# Patient Record
Sex: FEMALE | Race: WHITE | NOT HISPANIC OR LATINO | Employment: OTHER | URBAN - NONMETROPOLITAN AREA
[De-identification: names, ages, dates, MRNs, and addresses within clinical notes are randomized per-mention and may not be internally consistent; named-entity substitution may affect disease eponyms.]

---

## 2018-04-11 ENCOUNTER — OFFICE VISIT (OUTPATIENT)
Dept: CARDIOLOGY | Facility: CLINIC | Age: 63
End: 2018-04-11

## 2018-04-11 VITALS
DIASTOLIC BLOOD PRESSURE: 71 MMHG | HEART RATE: 79 BPM | HEIGHT: 63 IN | BODY MASS INDEX: 40.36 KG/M2 | WEIGHT: 227.8 LBS | OXYGEN SATURATION: 94 % | SYSTOLIC BLOOD PRESSURE: 116 MMHG

## 2018-04-11 DIAGNOSIS — R07.2 PRECORDIAL PAIN: Primary | ICD-10-CM

## 2018-04-11 DIAGNOSIS — R53.83 FATIGUE, UNSPECIFIED TYPE: ICD-10-CM

## 2018-04-11 DIAGNOSIS — E78.2 MIXED HYPERLIPIDEMIA: ICD-10-CM

## 2018-04-11 DIAGNOSIS — E11.9 TYPE 2 DIABETES MELLITUS WITHOUT COMPLICATION, UNSPECIFIED LONG TERM INSULIN USE STATUS: ICD-10-CM

## 2018-04-11 DIAGNOSIS — R06.02 SHORTNESS OF BREATH: ICD-10-CM

## 2018-04-11 DIAGNOSIS — F17.200 SMOKER: ICD-10-CM

## 2018-04-11 DIAGNOSIS — I10 ESSENTIAL HYPERTENSION: ICD-10-CM

## 2018-04-11 PROBLEM — E07.9 THYROID DISEASE: Status: ACTIVE | Noted: 2018-04-11

## 2018-04-11 PROCEDURE — 99204 OFFICE O/P NEW MOD 45 MIN: CPT | Performed by: INTERNAL MEDICINE

## 2018-04-11 RX ORDER — HYDROCODONE BITARTRATE AND ACETAMINOPHEN 10; 325 MG/15ML; MG/15ML
1 SOLUTION ORAL EVERY 8 HOURS PRN
Refills: 0 | COMMUNITY
Start: 2018-03-23

## 2018-04-11 RX ORDER — GABAPENTIN 800 MG/1
600 TABLET ORAL 3 TIMES DAILY
Refills: 0 | COMMUNITY
Start: 2018-03-27 | End: 2022-06-01 | Stop reason: DRUGHIGH

## 2018-04-11 RX ORDER — ATORVASTATIN CALCIUM 80 MG/1
80 TABLET, FILM COATED ORAL
Refills: 0 | COMMUNITY
Start: 2018-04-02

## 2018-04-11 RX ORDER — ALPRAZOLAM 0.5 MG/1
0.5 TABLET ORAL 2 TIMES DAILY
Refills: 0 | COMMUNITY
Start: 2018-03-13 | End: 2018-06-06 | Stop reason: ALTCHOICE

## 2018-04-11 RX ORDER — ESOMEPRAZOLE MAGNESIUM 40 MG/1
CAPSULE, DELAYED RELEASE ORAL DAILY
COMMUNITY
Start: 2018-04-10

## 2018-04-11 RX ORDER — LISINOPRIL 10 MG/1
10 TABLET ORAL DAILY
Refills: 0 | COMMUNITY
Start: 2018-02-09 | End: 2019-05-15 | Stop reason: SDUPTHER

## 2018-04-11 RX ORDER — LEVOTHYROXINE SODIUM 0.12 MG/1
125 TABLET ORAL DAILY
Refills: 0 | COMMUNITY
Start: 2018-04-02

## 2018-04-11 RX ORDER — CHOLECALCIFEROL (VITAMIN D3) 1250 MCG
CAPSULE ORAL
Refills: 0 | COMMUNITY
Start: 2018-04-02 | End: 2018-06-06

## 2018-04-11 RX ORDER — ASPIRIN 81 MG/1
81 TABLET ORAL DAILY
Qty: 30 TABLET | Refills: 11 | Status: SHIPPED | OUTPATIENT
Start: 2018-04-11

## 2018-04-11 RX ORDER — NITROGLYCERIN 0.4 MG/1
TABLET SUBLINGUAL
Qty: 25 TABLET | Refills: 2 | Status: SHIPPED | OUTPATIENT
Start: 2018-04-11 | End: 2020-11-05 | Stop reason: SDUPTHER

## 2018-06-06 ENCOUNTER — OFFICE VISIT (OUTPATIENT)
Dept: CARDIOLOGY | Facility: CLINIC | Age: 63
End: 2018-06-06

## 2018-06-06 VITALS
DIASTOLIC BLOOD PRESSURE: 73 MMHG | SYSTOLIC BLOOD PRESSURE: 137 MMHG | WEIGHT: 226 LBS | HEIGHT: 63 IN | BODY MASS INDEX: 40.04 KG/M2 | OXYGEN SATURATION: 94 % | HEART RATE: 92 BPM

## 2018-06-06 DIAGNOSIS — I10 ESSENTIAL HYPERTENSION: ICD-10-CM

## 2018-06-06 DIAGNOSIS — R07.9 CHEST PAIN, UNSPECIFIED TYPE: ICD-10-CM

## 2018-06-06 DIAGNOSIS — R06.02 SHORTNESS OF BREATH: Primary | ICD-10-CM

## 2018-06-06 PROCEDURE — 99214 OFFICE O/P EST MOD 30 MIN: CPT | Performed by: PHYSICIAN ASSISTANT

## 2018-06-06 RX ORDER — MELOXICAM 7.5 MG/1
7.5 TABLET ORAL DAILY
COMMUNITY

## 2018-06-06 NOTE — PROGRESS NOTES
"Problem list     Subjective   Kathrine Bella is a 62 y.o. female     Chief Complaint   Patient presents with   • Hypertension     Here for testing f/u, tests not done   • Hyperlipidemia   • Diabetes       HPI    Patient is a 62-year-old female that presents back to the office for follow-up.  Patient was seen in April by Dr. Yuan for an initial visit.  She complained of chest pain and dyspnea.  Patient has history of nonobstructive coronary artery disease by catheterization approximately 10 years ago.  However, because of patient's continued symptoms recently, she was scheduled for an ischemia assessment.  Patient did not undergo testing because she was told by acquaintances that stress test or dangerous because they \"stop your heart and cause a heart attack\".    Patient continues to have symptoms.  We have counseled her thatThat is not the case.  She is ready for further evaluation      Outpatient Encounter Prescriptions as of 6/6/2018   Medication Sig Dispense Refill   • aspirin 81 MG EC tablet Take 1 tablet by mouth Daily. 30 tablet 11   • atorvastatin (LIPITOR) 80 MG tablet Take 80 mg by mouth every night at bedtime.  0   • esomeprazole (nexIUM) 40 MG capsule Daily.     • gabapentin (NEURONTIN) 600 MG tablet Take 600 mg by mouth 2 (Two) Times a Day.  0   • HYDROcodone-acetaminophen (NORCO) 5-325 MG per tablet Take 1 tablet by mouth 2 (Two) Times a Day As Needed. for pain  0   • levothyroxine (SYNTHROID, LEVOTHROID) 125 MCG tablet Take 125 mcg by mouth Daily.  0   • lisinopril (PRINIVIL,ZESTRIL) 10 MG tablet Take 10 mg by mouth Daily.  0   • meloxicam (MOBIC) 7.5 MG tablet Take 7.5 mg by mouth Daily.     • metFORMIN ER (GLUCOPHAGE-XR) 500 MG 24 hr tablet 2 (Two) Times a Day.  0   • PROAIR  (90 Base) MCG/ACT inhaler As Needed.  0   • nitroglycerin (NITROSTAT) 0.4 MG SL tablet 1 under the tongue as needed for angina, may repeat q5mins for up three doses 25 tablet 2   • [DISCONTINUED] ALPRAZolam (XANAX) 0.5 MG " tablet Take 0.5 mg by mouth 2 (Two) Times a Day.  0   • [DISCONTINUED] Cholecalciferol (VITAMIN D3) 63981 units capsule Every 14 (Fourteen) Days.  0     No facility-administered encounter medications on file as of 6/6/2018.        Patient has no known allergies.    Past Medical History:   Diagnosis Date   • Asthma    • Coronary artery disease    • Diabetes mellitus    • Hyperlipidemia    • Hypertension    • Myocardial infarction    • Thyroid disease        Social History     Social History   • Marital status: Legally      Spouse name: N/A   • Number of children: N/A   • Years of education: N/A     Occupational History   • Not on file.     Social History Main Topics   • Smoking status: Current Every Day Smoker   • Smokeless tobacco: Never Used   • Alcohol use No   • Drug use: No   • Sexual activity: Defer     Other Topics Concern   • Not on file     Social History Narrative   • No narrative on file       Family History   Problem Relation Age of Onset   • Heart disease Mother    • Heart attack Mother    • Hypertension Mother    • Heart disease Father    • Heart attack Father    • Hypertension Father    • Heart disease Brother        Review of Systems   Constitutional: Negative.    HENT: Negative.    Eyes: Positive for visual disturbance (glasses prn).   Respiratory: Positive for shortness of breath.    Cardiovascular: Positive for chest pain and leg swelling. Negative for palpitations.   Gastrointestinal: Negative.    Endocrine: Negative.    Genitourinary: Negative.    Musculoskeletal: Positive for arthralgias and myalgias.   Skin: Negative.    Allergic/Immunologic: Positive for environmental allergies.   Neurological: Negative.    Hematological: Negative.    Psychiatric/Behavioral: Negative.    All other systems reviewed and are negative.      Objective   Vitals:    06/06/18 1309   BP: 137/73   BP Location: Left arm   Patient Position: Sitting   Pulse: 92   SpO2: 94%   Weight: 103 kg (226 lb)   Height: 160  "cm (62.99\")      /73 (BP Location: Left arm, Patient Position: Sitting)   Pulse 92   Ht 160 cm (62.99\")   Wt 103 kg (226 lb)   SpO2 94%   BMI 40.04 kg/m²     Lab Results (most recent)     None          Physical Exam   Constitutional: She is oriented to person, place, and time. She appears well-developed and well-nourished. No distress.   HENT:   Head: Normocephalic and atraumatic.   Eyes: EOM are normal. Pupils are equal, round, and reactive to light.   Neck: No JVD present.   Cardiovascular: Normal rate, regular rhythm, normal heart sounds and intact distal pulses.  Exam reveals no gallop and no friction rub.    No murmur heard.  Pulmonary/Chest: Effort normal and breath sounds normal. No respiratory distress. She has no wheezes. She has no rales. She exhibits no tenderness.   Musculoskeletal: Normal range of motion. She exhibits no edema.   Neurological: She is alert and oriented to person, place, and time. No cranial nerve deficit.   Skin: Skin is warm and dry. No rash noted. No erythema. No pallor.   Psychiatric: She has a normal mood and affect. Her behavior is normal.   Nursing note and vitals reviewed.      Procedure   Procedures       Assessment/Plan     Problems Addressed this Visit        Cardiovascular and Mediastinum    Essential hypertension    Relevant Orders    Stress Test With Myocardial Perfusion One Day    Adult Transthoracic Echo Complete W/ Cont if Necessary Per Protocol       Respiratory    Shortness of breath - Primary    Relevant Orders    Stress Test With Myocardial Perfusion One Day    Adult Transthoracic Echo Complete W/ Cont if Necessary Per Protocol       Nervous and Auditory    Chest pain    Relevant Orders    Stress Test With Myocardial Perfusion One Day    Adult Transthoracic Echo Complete W/ Cont if Necessary Per Protocol              Recommendation  1.  Patient will be rescheduled for ischemia assessment.  She is on appropriate medications otherwise.  Any chest pain or " supple nitroglycerin, she will go to the ER.  Otherwise we will see her back for follow-up on testing.  Follow-up primary as scheduled       I advised the patient of the risks in continuing to use tobacco, and I provided this patient with smoking cessation educational materials.    During this visit, I spent <3 minutes counseling the patient regarding smoking cessation.     Patient's Body mass index is 40.04 kg/m². BMI is above normal parameters. Recommendations include: educational material.       Electronically signed by:

## 2018-06-06 NOTE — PATIENT INSTRUCTIONS
For more information:    Quit Now [place]  1-800-QUIT-NOW  https://[place].quitlogix.org/en-[place]/  Steps to Quit Smoking  Smoking tobacco can be harmful to your health and can affect almost every organ in your body. Smoking puts you, and those around you, at risk for developing many serious chronic diseases. Quitting smoking is difficult, but it is one of the best things that you can do for your health. It is never too late to quit.  What are the benefits of quitting smoking?  When you quit smoking, you lower your risk of developing serious diseases and conditions, such as:  · Lung cancer or lung disease, such as COPD.  · Heart disease.  · Stroke.  · Heart attack.  · Infertility.  · Osteoporosis and bone fractures.  Additionally, symptoms such as coughing, wheezing, and shortness of breath may get better when you quit. You may also find that you get sick less often because your body is stronger at fighting off colds and infections. If you are pregnant, quitting smoking can help to reduce your chances of having a baby of low birth weight.  How do I get ready to quit?  When you decide to quit smoking, create a plan to make sure that you are successful. Before you quit:  · Pick a date to quit. Set a date within the next two weeks to give you time to prepare.  · Write down the reasons why you are quitting. Keep this list in places where you will see it often, such as on your bathroom mirror or in your car or wallet.  · Identify the people, places, things, and activities that make you want to smoke (triggers) and avoid them. Make sure to take these actions:  ¨ Throw away all cigarettes at home, at work, and in your car.  ¨ Throw away smoking accessories, such as ashtrays and lighters.  ¨ Clean your car and make sure to empty the ashtray.  ¨ Clean your home, including curtains and carpets.  · Tell your family, friends, and coworkers that you are quitting. Support from your loved ones can make quitting easier.  · Talk with  your health care provider about your options for quitting smoking.  · Find out what treatment options are covered by your health insurance.  What strategies can I use to quit smoking?  Talk with your healthcare provider about different strategies to quit smoking. Some strategies include:  · Quitting smoking altogether instead of gradually lessening how much you smoke over a period of time. Research shows that quitting “cold turkey” is more successful than gradually quitting.  · Attending in-person counseling to help you build problem-solving skills. You are more likely to have success in quitting if you attend several counseling sessions. Even short sessions of 10 minutes can be effective.  · Finding resources and support systems that can help you to quit smoking and remain smoke-free after you quit. These resources are most helpful when you use them often. They can include:  ¨ Online chats with a counselor.  ¨ Telephone quitlines.  ¨ Printed self-help materials.  ¨ Support groups or group counseling.  ¨ Text messaging programs.  ¨ Mobile phone applications.  · Taking medicines to help you quit smoking. (If you are pregnant or breastfeeding, talk with your health care provider first.) Some medicines contain nicotine and some do not. Both types of medicines help with cravings, but the medicines that include nicotine help to relieve withdrawal symptoms. Your health care provider may recommend:  ¨ Nicotine patches, gum, or lozenges.  ¨ Nicotine inhalers or sprays.  ¨ Non-nicotine medicine that is taken by mouth.  Talk with your health care provider about combining strategies, such as taking medicines while you are also receiving in-person counseling. Using these two strategies together makes you more likely to succeed in quitting than if you used either strategy on its own.  If you are pregnant or breastfeeding, talk with your health care provider about finding counseling or other support strategies to quit smoking. Do  not take medicine to help you quit smoking unless told to do so by your health care provider.  What things can I do to make it easier to quit?  Quitting smoking might feel overwhelming at first, but there is a lot that you can do to make it easier. Take these important actions:  · Reach out to your family and friends and ask that they support and encourage you during this time. Call telephone quitlines, reach out to support groups, or work with a counselor for support.  · Ask people who smoke to avoid smoking around you.  · Avoid places that trigger you to smoke, such as bars, parties, or smoke-break areas at work.  · Spend time around people who do not smoke.  · Lessen stress in your life, because stress can be a smoking trigger for some people. To lessen stress, try:  ¨ Exercising regularly.  ¨ Deep-breathing exercises.  ¨ Yoga.  ¨ Meditating.  ¨ Performing a body scan. This involves closing your eyes, scanning your body from head to toe, and noticing which parts of your body are particularly tense. Purposefully relax the muscles in those areas.  · Download or purchase mobile phone or tablet apps (applications) that can help you stick to your quit plan by providing reminders, tips, and encouragement. There are many free apps, such as QuitGuide from the CDC (Centers for Disease Control and Prevention). You can find other support for quitting smoking (smoking cessation) through smokefree.gov and other websites.  How will I feel when I quit smoking?  Within the first 24 hours of quitting smoking, you may start to feel some withdrawal symptoms. These symptoms are usually most noticeable 2-3 days after quitting, but they usually do not last beyond 2-3 weeks. Changes or symptoms that you might experience include:  · Mood swings.  · Restlessness, anxiety, or irritation.  · Difficulty concentrating.  · Dizziness.  · Strong cravings for sugary foods in addition to nicotine.  · Mild weight  gain.  · Constipation.  · Nausea.  · Coughing or a sore throat.  · Changes in how your medicines work in your body.  · A depressed mood.  · Difficulty sleeping (insomnia).  After the first 2-3 weeks of quitting, you may start to notice more positive results, such as:  · Improved sense of smell and taste.  · Decreased coughing and sore throat.  · Slower heart rate.  · Lower blood pressure.  · Clearer skin.  · The ability to breathe more easily.  · Fewer sick days.  Quitting smoking is very challenging for most people. Do not get discouraged if you are not successful the first time. Some people need to make many attempts to quit before they achieve long-term success. Do your best to stick to your quit plan, and talk with your health care provider if you have any questions or concerns.  This information is not intended to replace advice given to you by your health care provider. Make sure you discuss any questions you have with your health care provider.  Document Released: 12/12/2002 Document Revised: 08/15/2017 Document Reviewed: 05/03/2016  [employer] Interactive Patient Education © 2017 [employer]  Obesity, Adult  Obesity is the condition of having too much total body fat. Being overweight or obese means that your weight is greater than what is considered healthy for your body size. Obesity is determined by a measurement called BMI. BMI is an estimate of body fat and is calculated from height and weight. For adults, a BMI of 30 or higher is considered obese.  Obesity can eventually lead to other health concerns and major illnesses, including:  · Stroke.  · Coronary artery disease (CAD).  · Type 2 diabetes.  · Some types of cancer, including cancers of the colon, breast, uterus, and gallbladder.  · Osteoarthritis.  · High blood pressure (hypertension).  · High cholesterol.  · Sleep apnea.  · Gallbladder stones.  · Infertility problems.  What are the causes?  The main cause of obesity is taking in (consuming) more  calories than your body uses for energy. Other factors that contribute to this condition may include:  · Being born with genes that make you more likely to become obese.  · Having a medical condition that causes obesity. These conditions include:  ¨ Hypothyroidism.  ¨ Polycystic ovarian syndrome (PCOS).  ¨ Binge-eating disorder.  ¨ Cushing syndrome.  · Taking certain medicines, such as steroids, antidepressants, and seizure medicines.  · Not being physically active (sedentary lifestyle).  · Living where there are limited places to exercise safely or buy healthy foods.  · Not getting enough sleep.  What increases the risk?  The following factors may increase your risk of this condition:  · Having a family history of obesity.  · Being a woman of -American descent.  · Being a man of  descent.  What are the signs or symptoms?  Having excessive body fat is the main symptom of this condition.  How is this diagnosed?  This condition may be diagnosed based on:  · Your symptoms.  · Your medical history.  · A physical exam. Your health care provider may measure:  ¨ Your BMI. If you are an adult with a BMI between 25 and less than 30, you are considered overweight. If you are an adult with a BMI of 30 or higher, you are considered obese.  ¨ The distances around your hips and your waist (circumferences). These may be compared to each other to help diagnose your condition.  ¨ Your skinfold thickness. Your health care provider may gently pinch a fold of your skin and measure it.  How is this treated?  Treatment for this condition often includes changing your lifestyle. Treatment may include some or all of the following:  · Dietary changes. Work with your health care provider and a dietitian to set a weight-loss goal that is healthy and reasonable for you. Dietary changes may include eating:  ¨ Smaller portions. A portion size is the amount of a particular food that is healthy for you to eat at one time. This varies  from person to person.  ¨ Low-calorie or low-fat options.  ¨ More whole grains, fruits, and vegetables.  · Regular physical activity. This may include aerobic activity (cardio) and strength training.  · Medicine to help you lose weight. Your health care provider may prescribe medicine if you are unable to lose 1 pound a week after 6 weeks of eating more healthily and doing more physical activity.  · Surgery. Surgical options may include gastric banding and gastric bypass. Surgery may be done if:  ¨ Other treatments have not helped to improve your condition.  ¨ You have a BMI of 40 or higher.  ¨ You have life-threatening health problems related to obesity.  Follow these instructions at home:     Eating and drinking     · Follow recommendations from your health care provider about what you eat and drink. Your health care provider may advise you to:  ¨ Limit fast foods, sweets, and processed snack foods.  ¨ Choose low-fat options, such as low-fat milk instead of whole milk.  ¨ Eat 5 or more servings of fruits or vegetables every day.  ¨ Eat at home more often. This gives you more control over what you eat.  ¨ Choose healthy foods when you eat out.  ¨ Learn what a healthy portion size is.  ¨ Keep low-fat snacks on hand.  ¨ Avoid sugary drinks, such as soda, fruit juice, iced tea sweetened with sugar, and flavored milk.  ¨ Eat a healthy breakfast.  · Drink enough water to keep your urine clear or pale yellow.  · Do not go without eating for long periods of time (do not fast) or follow a fad diet. Fasting and fad diets can be unhealthy and even dangerous.  Physical Activity   · Exercise regularly, as told by your health care provider. Ask your health care provider what types of exercise are safe for you and how often you should exercise.  · Warm up and stretch before being active.  · Cool down and stretch after being active.  · Rest between periods of activity.  Lifestyle   · Limit the time that you spend in front of your  TV, computer, or video game system.  · Find ways to reward yourself that do not involve food.  · Limit alcohol intake to no more than 1 drink a day for nonpregnant women and 2 drinks a day for men. One drink equals 12 oz of beer, 5 oz of wine, or 1½ oz of hard liquor.  General instructions   · Keep a weight loss journal to keep track of the food you eat and how much you exercise you get.  · Take over-the-counter and prescription medicines only as told by your health care provider.  · Take vitamins and supplements only as told by your health care provider.  · Consider joining a support group. Your health care provider may be able to recommend a support group.  · Keep all follow-up visits as told by your health care provider. This is important.  Contact a health care provider if:  · You are unable to meet your weight loss goal after 6 weeks of dietary and lifestyle changes.  This information is not intended to replace advice given to you by your health care provider. Make sure you discuss any questions you have with your health care provider.  Document Released: 01/25/2006 Document Revised: 05/22/2017 Document Reviewed: 10/05/2016  [employer] Interactive Patient Education © 2017 [employer]  MyPlate from [employer]  The general, healthful diet is based on the 2010 Dietary Guidelines for Americans. The amount of food you need to eat from each food group depends on your age, sex, and level of physical activity and can be individualized by a dietitian. Go to ChooseMyPlate.gov for more information.  What do I need to know about the MyPlate plan?  · Enjoy your food, but eat less.  · Avoid oversized portions.  ¨ ½ of your plate should include fruits and vegetables.  ¨ ¼ of your plate should be grains.  ¨ ¼ of your plate should be protein.  Grains   · Make at least half of your grains whole grains.  · For a 2,000 calorie daily food plan, eat 6 oz every day.  · 1 oz is about 1 slice bread, 1 cup cereal, or ½ cup cooked rice,  cereal, or pasta.  Vegetables   · Make half your plate fruits and vegetables.  · For a 2,000 calorie daily food plan, eat 2½ cups every day.  · 1 cup is about 1 cup raw or cooked vegetables or vegetable juice or 2 cups raw leafy greens.  Fruits   · Make half your plate fruits and vegetables.  · For a 2,000 calorie daily food plan, eat 2 cups every day.  · 1 cup is about 1 cup fruit or 100% fruit juice or ½ cup dried fruit.  Protein   · For a 2,000 calorie daily food plan, eat 5½ oz every day.  · 1 oz is about 1 oz meat, poultry, or fish, ¼ cup cooked beans, 1 egg, 1 Tbsp peanut butter, or ½ oz nuts or seeds.  Dairy   · Switch to fat-free or low-fat (1%) milk.  · For a 2,000 calorie daily food plan, eat 3 cups every day.  · 1 cup is about 1 cup milk or yogurt or soy milk (soy beverage), 1½ oz natural cheese, or 2 oz processed cheese.  Fats, Oils, and Empty Calories   · Only small amounts of oils are recommended.  · Empty calories are calories from solid fats or added sugars.  · Compare sodium in foods like soup, bread, and frozen meals. Choose the foods with lower numbers.  · Drink water instead of sugary drinks.  What foods can I eat?  Grains   Whole grains such as whole wheat, quinoa, millet, and bulgur. Bread, rolls, and pasta made from whole grains. Brown or wild rice. Hot or cold cereals made from whole grains and without added sugar.  Vegetables   All fresh vegetables, especially fresh red, dark green, or orange vegetables. Peas and beans. Low-sodium frozen or canned vegetables prepared without added salt. Low-sodium vegetable juices.  Fruits   All fresh, frozen, and dried fruits. Canned fruit packed in water or fruit juice without added sugar. Fruit juices without added sugar.  Meats and Other Protein Sources   Boiled, baked, or grilled lean meat trimmed of fat. Skinless poultry. Fresh seafood and shellfish. Canned seafood packed in water. Unsalted nuts and unsalted nut butters. Tofu. Dried beans and pea.  Eggs.  Dairy   Low-fat or fat-free milk, yogurt, and cheeses.  Sweets and Desserts   Frozen desserts made from low-fat milk.  Fats and Oils   Olive, peanut, and canola oils and margarine. Salad dressing and mayonnaise made from these oils.  Other   Soups and casseroles made from allowed ingredients and without added fat or salt.  The items listed above may not be a complete list of recommended foods or beverages. Contact your dietitian for more options.   What foods are not recommended?  Grains   Sweetened, low-fiber cereals. Packaged baked goods. Snack crackers and chips. Cheese crackers, butter crackers, and biscuits. Frozen waffles, sweet breads, doughnuts, pastries, packaged baking mixes, pancakes, cakes, and cookies.  Vegetables   Regular canned or frozen vegetables or vegetables prepared with salt. Canned tomatoes. Canned tomato sauce. Fried vegetables. Vegetables in cream sauce or cheese sauce.  Fruits   Fruits packed in syrup or made with added sugar.  Meats and Other Protein Sources   Marbled or fatty meats such as ribs. Poultry with skin. Fried meats, poultry, eggs, or fish. Sausages, hot dogs, and deli meats such as pastrami, bologna, or salami.  Dairy   Whole milk, cream, cheeses made from whole milk, sour cream. Ice cream or yogurt made from whole milk or with added sugar.  Beverages   For adults, no more than one alcoholic drink per day. Regular soft drinks or other sugary beverages. Juice drinks.  Sweets and Desserts   Sugary or fatty desserts, candy, and other sweets.  Fats and Oils   Solid shortening or partially hydrogenated oils. Solid margarine. Margarine that contains trans fats. Butter.  The items listed above may not be a complete list of foods and beverages to avoid. Contact your dietitian for more information.   This information is not intended to replace advice given to you by your health care provider. Make sure you discuss any questions you have with your health care provider.  Document  Released: 01/06/2009 Document Revised: 05/25/2017 Document Reviewed: 11/26/2014  [employer] Interactive Patient Education © 2017 [employer]

## 2019-05-01 ENCOUNTER — OFFICE VISIT (OUTPATIENT)
Dept: CARDIOLOGY | Facility: CLINIC | Age: 64
End: 2019-05-01

## 2019-05-01 VITALS
SYSTOLIC BLOOD PRESSURE: 129 MMHG | HEART RATE: 87 BPM | WEIGHT: 218 LBS | OXYGEN SATURATION: 96 % | DIASTOLIC BLOOD PRESSURE: 75 MMHG | HEIGHT: 63 IN | BODY MASS INDEX: 38.62 KG/M2

## 2019-05-01 DIAGNOSIS — R07.9 CHEST PAIN, UNSPECIFIED TYPE: ICD-10-CM

## 2019-05-01 DIAGNOSIS — R06.02 SHORTNESS OF BREATH: Primary | ICD-10-CM

## 2019-05-01 DIAGNOSIS — R00.2 PALPITATIONS: ICD-10-CM

## 2019-05-01 PROCEDURE — 99214 OFFICE O/P EST MOD 30 MIN: CPT | Performed by: PHYSICIAN ASSISTANT

## 2019-05-01 PROCEDURE — 93000 ELECTROCARDIOGRAM COMPLETE: CPT | Performed by: PHYSICIAN ASSISTANT

## 2019-05-01 RX ORDER — ESCITALOPRAM OXALATE 20 MG/1
20 TABLET ORAL DAILY
COMMUNITY

## 2019-05-01 NOTE — PATIENT INSTRUCTIONS
Steps to Quit Smoking  Smoking tobacco can be bad for your health. It can also affect almost every organ in your body. Smoking puts you and people around you at risk for many serious long-lasting (chronic) diseases. Quitting smoking is hard, but it is one of the best things that you can do for your health. It is never too late to quit.  What are the benefits of quitting smoking?  When you quit smoking, you lower your risk for getting serious diseases and conditions. They can include:  · Lung cancer or lung disease.  · Heart disease.  · Stroke.  · Heart attack.  · Not being able to have children (infertility).  · Weak bones (osteoporosis) and broken bones (fractures).    If you have coughing, wheezing, and shortness of breath, those symptoms may get better when you quit. You may also get sick less often. If you are pregnant, quitting smoking can help to lower your chances of having a baby of low birth weight.  What can I do to help me quit smoking?  Talk with your doctor about what can help you quit smoking. Some things you can do (strategies) include:  · Quitting smoking totally, instead of slowly cutting back how much you smoke over a period of time.  · Going to in-person counseling. You are more likely to quit if you go to many counseling sessions.  · Using resources and support systems, such as:  ? Online chats with a counselor.  ? Phone quitlines.  ? Printed self-help materials.  ? Support groups or group counseling.  ? Text messaging programs.  ? Mobile phone apps or applications.  · Taking medicines. Some of these medicines may have nicotine in them. If you are pregnant or breastfeeding, do not take any medicines to quit smoking unless your doctor says it is okay. Talk with your doctor about counseling or other things that can help you.    Talk with your doctor about using more than one strategy at the same time, such as taking medicines while you are also going to in-person counseling. This can help make  quitting easier.  What things can I do to make it easier to quit?  Quitting smoking might feel very hard at first, but there is a lot that you can do to make it easier. Take these steps:  · Talk to your family and friends. Ask them to support and encourage you.  · Call phone quitlines, reach out to support groups, or work with a counselor.  · Ask people who smoke to not smoke around you.  · Avoid places that make you want (trigger) to smoke, such as:  ? Bars.  ? Parties.  ? Smoke-break areas at work.  · Spend time with people who do not smoke.  · Lower the stress in your life. Stress can make you want to smoke. Try these things to help your stress:  ? Getting regular exercise.  ? Deep-breathing exercises.  ? Yoga.  ? Meditating.  ? Doing a body scan. To do this, close your eyes, focus on one area of your body at a time from head to toe, and notice which parts of your body are tense. Try to relax the muscles in those areas.  · Download or buy apps on your mobile phone or tablet that can help you stick to your quit plan. There are many free apps, such as QuitGuide from the CDC (Centers for Disease Control and Prevention). You can find more support from smokefree.gov and other websites.    This information is not intended to replace advice given to you by your health care provider. Make sure you discuss any questions you have with your health care provider.  Document Released: 10/14/2010 Document Revised: 08/15/2017 Document Reviewed: 05/03/2016  [employer] Interactive Patient Education © 2019 [employer]  Fat and Cholesterol Restricted Eating Plan  Getting too much fat and cholesterol in your diet may cause health problems. Choosing the right foods helps keep your fat and cholesterol at normal levels. This can keep you from getting certain diseases.  Your doctor may recommend an eating plan that includes:  · Total fat: ______% or less of total calories a day.  · Saturated fat: ______% or less of total calories a  "day.  · Cholesterol: less than _________mg a day.  · Fiber: ______g a day.    What are tips for following this plan?  General tips  · Work with your doctor to lose weight if you need to.  · Avoid:  ? Foods with added sugar.  ? Fried foods.  ? Foods with partially hydrogenated oils.  · Limit alcohol intake to no more than 1 drink a day for nonpregnant women and 2 drinks a day for men. One drink equals 12 oz of beer, 5 oz of wine, or 1½ oz of hard liquor.  Reading food labels  · Check food labels for:  ? Trans fats.  ? Partially hydrogenated oils.  ? Saturated fat (g) in each serving.  ? Cholesterol (mg) in each serving.  ? Fiber (g) in each serving.  · Choose foods with healthy fats, such as:  ? Monounsaturated fats.  ? Polyunsaturated fats.  ? Omega-3 fats.  · Choose grain products that have whole grains. Look for the word \"whole\" as the first word in the ingredient list.  Cooking  · Cook foods using low-fat methods. These include baking, boiling, grilling, and broiling.  · Eat more home-cooked foods. Eat at restaurants and buffets less often.  · Avoid cooking using saturated fats, such as butter, cream, palm oil, palm kernel oil, and coconut oil.  Meal planning  · At meals, divide your plate into four equal parts:  ? Fill one-half of your plate with vegetables and green salads.  ? Fill one-fourth of your plate with whole grains.  ? Fill one-fourth of your plate with low-fat (lean) protein foods.  · Eat fish that is high in omega-3 fats at least two times a week. This includes mackerel, tuna, sardines, and salmon.  · Eat foods that are high in fiber, such as whole grains, beans, apples, broccoli, carrots, peas, and barley.  Recommended foods  Grains  · Whole grains, such as whole wheat or whole grain breads, crackers, cereals, and pasta. Unsweetened oatmeal, bulgur, barley, quinoa, or brown rice. Corn or whole wheat flour tortillas.  Vegetables  · Fresh or frozen vegetables (raw, steamed, roasted, or grilled). Green " salads.  Fruits  · All fresh, canned (in natural juice), or frozen fruits.  Meats and other protein foods  · Ground beef (85% or leaner), grass-fed beef, or beef trimmed of fat. Skinless chicken or turkey. Ground chicken or turkey. Pork trimmed of fat. All fish and seafood. Egg whites. Dried beans, peas, or lentils. Unsalted nuts or seeds. Unsalted canned beans. Nut butters without added sugar or oil.  Dairy  · Low-fat or nonfat dairy products, such as skim or 1% milk, 2% or reduced-fat cheeses, low-fat and fat-free ricotta or cottage cheese, or plain low-fat and nonfat yogurt.  Fats and oils  · Tub margarine without trans fats. Light or reduced-fat mayonnaise and salad dressings. Avocado. Olive, canola, sesame, or safflower oils.  The items listed above may not be a complete list of recommended foods or beverages. Contact your dietitian for more options.  Foods to avoid  Grains  · White bread. White pasta. White rice. Cornbread. Bagels, pastries, and croissants. Crackers and snack foods that contain trans fat and hydrogenated oils.  Vegetables  · Vegetables cooked in cheese, cream, or butter sauce. Fried vegetables.  Fruits  · Canned fruit in heavy syrup. Fruit in cream or butter sauce. Fried fruit.  Meats and other protein foods  · Fatty cuts of meat. Ribs, chicken wings, bullock, sausage, bologna, salami, chitterlings, fatback, hot dogs, bratwurst, and packaged lunch meats. Liver and organ meats. Whole eggs and egg yolks. Chicken and turkey with skin. Fried meat.  Dairy  · Whole or 2% milk, cream, half-and-half, and cream cheese. Whole milk cheeses. Whole-fat or sweetened yogurt. Full-fat cheeses. Nondairy creamers and whipped toppings. Processed cheese, cheese spreads, and cheese curds.  Beverages  · Alcohol. Sugar-sweetened drinks such as sodas, lemonade, and fruit drinks.  Fats and oils  · Butter, stick margarine, lard, shortening, ghee, or bullock fat. Coconut, palm kernel, and palm oils.  Sweets and  desserts  · Corn syrup, sugars, honey, and molasses. Candy. Jam and jelly. Syrup. Sweetened cereals. Cookies, pies, cakes, donuts, muffins, and ice cream.  The items listed above may not be a complete list of foods and beverages to avoid. Contact your dietitian for more information.  Summary  · Choosing the right foods helps keep your fat and cholesterol at normal levels. This can keep you from getting certain diseases.  · At meals, fill one-half of your plate with vegetables and green salads.  · Eat high-fiber foods, like whole grains, beans, apples, carrots, peas, and barley.  · Limit added sugar, saturated fats, alcohol, and fried foods.  This information is not intended to replace advice given to you by your health care provider. Make sure you discuss any questions you have with your health care provider.  Document Released: 06/18/2013 Document Revised: 09/04/2018 Document Reviewed: 09/04/2018  [employer] Interactive Patient Education © 2019 [employer]  BMI for Adults  Body mass index (BMI) is a number that is calculated from a person's weight and height. In most adults, the number is used to find how much of an adult's weight is made up of fat. BMI is not as accurate as a direct measure of body fat.  How is BMI calculated?  BMI is calculated by dividing weight in kilograms by height in meters squared. It can also be calculated by dividing weight in pounds by height in inches squared, then multiplying the resulting number by 703. Charts are available to help you find your BMI quickly and easily without doing this calculation.  How is BMI interpreted?  Health care professionals use BMI charts to identify whether an adult is underweight, at a normal weight, or overweight based on the following guidelines:  · Underweight: BMI less than 18.5.  · Normal weight: BMI between 18.5 and 24.9.  · Overweight: BMI between 25 and 29.9.  · Obese: BMI of 30 and above.    BMI is usually interpreted the same for males and  females.  Weight includes both fat and muscle, so someone with a muscular build, such as an athlete, may have a BMI that is higher than 24.9. In cases like these, BMI may not accurately depict body fat. To determine if excess body fat is the cause of a BMI of 25 or higher, further assessments may need to be done by a health care provider.  Why is BMI a useful tool?  BMI is used to identify a possible weight problem that may be related to a medical problem or may increase the risk for medical problems. BMI can also be used to promote changes to reach a healthy weight.  This information is not intended to replace advice given to you by your health care provider. Make sure you discuss any questions you have with your health care provider.  Document Released: 08/29/2005 Document Revised: 04/27/2017 Document Reviewed: 05/15/2015  [employer] Interactive Patient Education © 2018 [employer]

## 2019-05-01 NOTE — PROGRESS NOTES
Problem list     Subjective   Kathrine Bella is a 63 y.o. female     Chief Complaint   Patient presents with   • Hypertension   • Shortness of Breath       HPI      Patient is a 63-year-old female that presents to the office today for follow-up.  Patient was seen last year on 2 occasions with complaints of chest discomfort and scheduled for ischemia assessment.  She did not have this performed.    Patient describes being scared and did not want to have testing performed.  Patient describes that she has been having chest pain but over the last 2 to 3 days it has improved.  She describes sharp discomfort and pressure sensation in the precordial area.  This is not associated with arm or neck discomfort.  No nausea or diaphoresis.    She has mild levels of shortness of breath when exerting as well.  She has no PND orthopnea.    She does feel palpitations.  Interestingly, whenever she had chest pain, she will check her pulse and feel an irregular beat.  She has no associated dizziness presyncope or syncope.  She otherwise is doing well    Outpatient Encounter Medications as of 5/1/2019   Medication Sig Dispense Refill   • aspirin 81 MG EC tablet Take 1 tablet by mouth Daily. 30 tablet 11   • atorvastatin (LIPITOR) 80 MG tablet Take 80 mg by mouth every night at bedtime.  0   • escitalopram (LEXAPRO) 20 MG tablet Take 20 mg by mouth Daily.     • esomeprazole (nexIUM) 40 MG capsule Daily.     • gabapentin (NEURONTIN) 600 MG tablet Take 800 mg by mouth 3 (Three) Times a Day.  0   • HYDROcodone-acetaminophen (NORCO) 5-325 MG per tablet Take 1 tablet by mouth 2 (Two) Times a Day As Needed. for pain  0   • levothyroxine (SYNTHROID, LEVOTHROID) 125 MCG tablet Take 125 mcg by mouth Daily.  0   • lisinopril (PRINIVIL,ZESTRIL) 10 MG tablet Take 10 mg by mouth Daily.  0   • meloxicam (MOBIC) 7.5 MG tablet Take 7.5 mg by mouth Daily.     • metFORMIN ER (GLUCOPHAGE-XR) 500 MG 24 hr tablet 1,000 mg 2 (Two) Times a Day.  0   •  nitroglycerin (NITROSTAT) 0.4 MG SL tablet 1 under the tongue as needed for angina, may repeat q5mins for up three doses 25 tablet 2   • PROAIR  (90 Base) MCG/ACT inhaler As Needed.  0     No facility-administered encounter medications on file as of 5/1/2019.        Patient has no known allergies.    Past Medical History:   Diagnosis Date   • Asthma    • Coronary artery disease    • Diabetes mellitus (CMS/HCC)    • Hyperlipidemia    • Hypertension    • Myocardial infarction (CMS/HCC)    • Thyroid disease        Social History     Socioeconomic History   • Marital status: Legally      Spouse name: Not on file   • Number of children: Not on file   • Years of education: Not on file   • Highest education level: Not on file   Tobacco Use   • Smoking status: Current Every Day Smoker   • Smokeless tobacco: Never Used   Substance and Sexual Activity   • Alcohol use: No   • Drug use: No   • Sexual activity: Defer       Family History   Problem Relation Age of Onset   • Heart disease Mother    • Heart attack Mother    • Hypertension Mother    • Heart disease Father    • Heart attack Father    • Hypertension Father    • Heart disease Brother        Review of Systems   Constitutional: Negative.  Negative for chills, fatigue and fever.   HENT: Positive for congestion (seasonal congestion ) and rhinorrhea (runny nose ). Negative for sore throat.    Eyes: Positive for visual disturbance (reading glasses ).   Respiratory: Positive for shortness of breath (SOA at rest and when she has chest pain ). Negative for chest tightness.    Cardiovascular: Positive for chest pain (Has pain in chest at least twice weekly. Sometimes sharp pains and sometimes dull aches. Will last 3-5 minutes. ) and palpitations (Flutters, races ). Negative for leg swelling.   Gastrointestinal: Negative.    Endocrine: Positive for heat intolerance (stays hot ). Negative for cold intolerance.   Genitourinary: Negative.    Musculoskeletal: Positive  "for arthralgias (joints ) and back pain (lower back pain ).   Skin: Negative.  Negative for rash and wound.   Allergic/Immunologic: Positive for environmental allergies (seasonal ). Negative for food allergies.   Neurological: Positive for headaches (frequent headaches in the last few weeks). Negative for dizziness, weakness and light-headedness.   Hematological: Negative.  Does not bruise/bleed easily.   Psychiatric/Behavioral: Positive for sleep disturbance (Did have one episode of waking up gasping for air ).   All other systems reviewed and are negative.      Objective   Vitals:    05/01/19 0831   BP: 129/75   BP Location: Left arm   Patient Position: Sitting   Pulse: 87   SpO2: 96%   Weight: 98.9 kg (218 lb)   Height: 160 cm (63\")      /75 (BP Location: Left arm, Patient Position: Sitting)   Pulse 87   Ht 160 cm (63\")   Wt 98.9 kg (218 lb)   SpO2 96%   BMI 38.62 kg/m²     Lab Results (most recent)     None          Physical Exam   Constitutional: She is oriented to person, place, and time. She appears well-developed and well-nourished. No distress.   HENT:   Head: Normocephalic and atraumatic.   Eyes: EOM are normal. Pupils are equal, round, and reactive to light.   Neck: No JVD present.   Cardiovascular: Normal rate, regular rhythm, normal heart sounds and intact distal pulses. Exam reveals no gallop and no friction rub.   No murmur heard.  Pulmonary/Chest: Effort normal and breath sounds normal. No respiratory distress. She has no wheezes. She has no rales. She exhibits no tenderness.   Musculoskeletal: Normal range of motion. She exhibits no edema.   Neurological: She is alert and oriented to person, place, and time. No cranial nerve deficit.   Skin: Skin is warm and dry. No rash noted. No erythema. No pallor.   Psychiatric: She has a normal mood and affect. Her behavior is normal.   Nursing note and vitals reviewed.      Procedure     ECG 12 Lead  Date/Time: 5/1/2019 8:46 AM  Performed by: " Franky Aldana PA  Authorized by: Franky Aldana PA   Comments: SOA    EKG demonstrates sinus rhythm at 82 bpm, low voltage, no acute ST changes               Assessment/Plan     Problems Addressed this Visit        Cardiovascular and Mediastinum    Palpitations    Relevant Orders    Adult Transthoracic Echo Complete W/ Cont if Necessary Per Protocol    Cardiac Event Monitor    Stress Test With Myocardial Perfusion One Day       Respiratory    Shortness of breath - Primary    Relevant Orders    ECG 12 Lead    Adult Transthoracic Echo Complete W/ Cont if Necessary Per Protocol    Cardiac Event Monitor    Stress Test With Myocardial Perfusion One Day       Nervous and Auditory    Chest pain    Relevant Orders    Adult Transthoracic Echo Complete W/ Cont if Necessary Per Protocol    Cardiac Event Monitor    Stress Test With Myocardial Perfusion One Day          Recommendation  1.  Patient with complaints of chest pain and dyspnea with risk factors for coronary disease including hypertension dyslipidemia and diabetes mellitus.  We will schedule for ischemia assessment.  2.  Nuclear stress test will be ordered for risk stratification.  3.  Echocardiogram to evaluate LV function, assess diastolic performance rule out valvular disease.  4.  Patient with complaints of chest pain during episodes of palpitations.  Will schedule for event monitoring.  5.  She has nitroglycerin as needed for chest pain.  Any chest pain, not resolved by nitroglycerin, she will go to the ER.  She will follow with primary as scheduled           I advised Kathrine of the risks of continuing to use tobacco, and I provided her with tobacco cessation educational materials in the After Visit Summary.     During this visit, I spent >3 minutes counseling the patient regarding tobacco cessation.     Patient's Body mass index is 38.62 kg/m². BMI is above normal parameters. Recommendations include: educational material and referral to primary care.        Electronically signed by:

## 2019-05-15 ENCOUNTER — OUTSIDE FACILITY SERVICE (OUTPATIENT)
Dept: CARDIOLOGY | Facility: CLINIC | Age: 64
End: 2019-05-15

## 2019-05-15 ENCOUNTER — TELEPHONE (OUTPATIENT)
Dept: CARDIOLOGY | Facility: CLINIC | Age: 64
End: 2019-05-15

## 2019-05-15 PROCEDURE — 93228 REMOTE 30 DAY ECG REV/REPORT: CPT | Performed by: INTERNAL MEDICINE

## 2019-05-15 RX ORDER — LISINOPRIL 5 MG/1
5 TABLET ORAL DAILY
Qty: 30 TABLET | Refills: 11 | Status: SHIPPED | OUTPATIENT
Start: 2019-05-15 | End: 2022-06-01

## 2019-05-15 RX ORDER — METOPROLOL SUCCINATE 25 MG/1
25 TABLET, EXTENDED RELEASE ORAL DAILY
Qty: 30 TABLET | Refills: 11 | Status: SHIPPED | OUTPATIENT
Start: 2019-05-15 | End: 2020-06-05 | Stop reason: SDUPTHER

## 2019-05-15 NOTE — TELEPHONE ENCOUNTER
Called patient due to serious monitor report. Patient states she is doing well but  does notice an occasional flutter. She denies chest pain and shortness of breath. Verbal order per Franky Aldana PA-C to decrease Lisinopril to 5 mg daily and start Toprol 25 mg daily. Scheduling has not been able to reach patient to schedule stress test and echo. Patient was given all 3 numbers to scheduling and advised to call office if she is not able to schedule testing. Patient verbalized understanding with no questions. Jennie Carrera MA

## 2019-05-20 ENCOUNTER — TELEPHONE (OUTPATIENT)
Dept: CARDIOLOGY | Facility: CLINIC | Age: 64
End: 2019-05-20

## 2019-05-20 NOTE — TELEPHONE ENCOUNTER
Patient called back. States she had one episode of some chest discomfort on 5/17/19. It didn't last very long, otherwise denied having any palpitations or increased shortness of air. States she did try to call back to scheduling in [place] and left them a msg. She will let us know if she is unable to schedule tests. After testing she will keep follow up as scheduled in June and call or come in sooner as needed.  NATHALIA ROSARIO    5/20/19  L/M asking patient to call back about heart monitor. Per Franky Aldana PA-C she did have 2 episodes of NSVT. He wanted to check on her symptoms and find out about her stress and echo. According to [place] scheduling they have been unable to reach patient to schedule these. NATHALIA ROSARIO

## 2019-06-19 ENCOUNTER — TELEPHONE (OUTPATIENT)
Dept: CARDIOLOGY | Facility: CLINIC | Age: 64
End: 2019-06-19

## 2019-06-19 NOTE — TELEPHONE ENCOUNTER
TRIED TO CALL PT TO 1-CONFORM APPT AND 2-ASK PT IF SHE WANTED TO R/S MISSED TESTING. UNABLE TO REACH PT AFTER SEVERAL ATTEMPTS. PT NO SHOW FOR APPT AND ALSO UNABLE TO REACH PT TO R/S. LETTER MAILED

## 2019-07-24 ENCOUNTER — TELEPHONE (OUTPATIENT)
Dept: CARDIOLOGY | Facility: CLINIC | Age: 64
End: 2019-07-24

## 2019-07-24 DIAGNOSIS — R00.2 PALPITATIONS: ICD-10-CM

## 2019-07-24 DIAGNOSIS — R07.9 CHEST PAIN, UNSPECIFIED TYPE: Primary | ICD-10-CM

## 2019-07-24 DIAGNOSIS — R06.02 SHORTNESS OF BREATH: ICD-10-CM

## 2019-07-24 NOTE — TELEPHONE ENCOUNTER
----- Message from Amanda Morelos sent at 7/24/2019  9:02 AM EDT -----   Pt needs echo reordered, as the old order was attached to a visit that was cx/no show and is invalid now.

## 2019-09-05 ENCOUNTER — HOSPITAL ENCOUNTER (OUTPATIENT)
Dept: CARDIOLOGY | Facility: HOSPITAL | Age: 64
Discharge: HOME | End: 2019-09-05

## 2019-09-05 ENCOUNTER — HOSPITAL ENCOUNTER (OUTPATIENT)
Dept: CARDIOLOGY | Facility: HOSPITAL | Age: 64
End: 2019-09-05

## 2019-09-05 VITALS — HEIGHT: 63 IN | BODY MASS INDEX: 38.63 KG/M2 | WEIGHT: 218.03 LBS

## 2019-09-05 DIAGNOSIS — R07.9 CHEST PAIN, UNSPECIFIED TYPE: ICD-10-CM

## 2019-09-05 DIAGNOSIS — R06.02 SHORTNESS OF BREATH: ICD-10-CM

## 2019-09-05 DIAGNOSIS — R00.2 PALPITATIONS: ICD-10-CM

## 2019-09-05 PROCEDURE — 93306 TTE W/DOPPLER COMPLETE: CPT

## 2019-09-05 PROCEDURE — 93306 TTE W/DOPPLER COMPLETE: CPT | Performed by: INTERNAL MEDICINE

## 2019-09-11 ENCOUNTER — APPOINTMENT (OUTPATIENT)
Dept: CARDIOLOGY | Facility: HOSPITAL | Age: 64
End: 2019-09-11

## 2019-09-11 ENCOUNTER — HOSPITAL ENCOUNTER (OUTPATIENT)
Dept: CARDIOLOGY | Facility: HOSPITAL | Age: 64
End: 2019-09-11

## 2019-09-12 ENCOUNTER — HOSPITAL ENCOUNTER (OUTPATIENT)
Dept: CARDIOLOGY | Facility: HOSPITAL | Age: 64
Discharge: HOME | End: 2019-09-12

## 2019-09-12 PROCEDURE — 78452 HT MUSCLE IMAGE SPECT MULT: CPT

## 2019-09-12 PROCEDURE — 0 TECHNETIUM SESTAMIBI: Performed by: INTERNAL MEDICINE

## 2019-09-12 PROCEDURE — A9500 TC99M SESTAMIBI: HCPCS | Performed by: INTERNAL MEDICINE

## 2019-09-12 PROCEDURE — 93017 CV STRESS TEST TRACING ONLY: CPT

## 2019-09-12 PROCEDURE — 25010000002 REGADENOSON 0.4 MG/5ML SOLUTION: Performed by: INTERNAL MEDICINE

## 2019-09-12 PROCEDURE — 78452 HT MUSCLE IMAGE SPECT MULT: CPT | Performed by: INTERNAL MEDICINE

## 2019-09-12 PROCEDURE — 93018 CV STRESS TEST I&R ONLY: CPT | Performed by: INTERNAL MEDICINE

## 2019-09-12 RX ADMIN — TECHNETIUM TC 99M SESTAMIBI 1 DOSE: 1 INJECTION INTRAVENOUS at 14:03

## 2019-09-12 RX ADMIN — TECHNETIUM TC 99M SESTAMIBI 1 DOSE: 1 INJECTION INTRAVENOUS at 14:01

## 2019-09-12 RX ADMIN — REGADENOSON 0.4 MG: 0.08 INJECTION, SOLUTION INTRAVENOUS at 14:03

## 2019-09-14 LAB
BH CV NUCLEAR PRIOR STUDY: 3
BH CV STRESS BP STAGE 1: NORMAL
BH CV STRESS COMMENTS STAGE 1: NORMAL
BH CV STRESS DOSE REGADENOSON STAGE 1: 0.4
BH CV STRESS DURATION MIN STAGE 1: 0
BH CV STRESS DURATION SEC STAGE 1: 10
BH CV STRESS HR STAGE 1: 70
BH CV STRESS PROTOCOL 1: NORMAL
BH CV STRESS RECOVERY BP: NORMAL MMHG
BH CV STRESS RECOVERY HR: 70 BPM
BH CV STRESS STAGE 1: 1
MAXIMAL PREDICTED HEART RATE: 157 BPM
PERCENT MAX PREDICTED HR: 64.97 %
STRESS BASELINE BP: NORMAL MMHG
STRESS BASELINE HR: 56 BPM
STRESS PERCENT HR: 76 %
STRESS POST PEAK BP: NORMAL MMHG
STRESS POST PEAK HR: 102 BPM
STRESS TARGET HR: 133 BPM

## 2019-09-15 LAB
BH CV ECHO MEAS - ACS: 1.7 CM
BH CV ECHO MEAS - AO MAX PG (FULL): 1.1 MMHG
BH CV ECHO MEAS - AO MAX PG: 6.4 MMHG
BH CV ECHO MEAS - AO MEAN PG (FULL): 0.6 MMHG
BH CV ECHO MEAS - AO MEAN PG: 2.9 MMHG
BH CV ECHO MEAS - AO ROOT AREA (BSA CORRECTED): 1.6
BH CV ECHO MEAS - AO ROOT AREA: 8.3 CM^2
BH CV ECHO MEAS - AO ROOT DIAM: 3.3 CM
BH CV ECHO MEAS - AO V2 MAX: 126.1 CM/SEC
BH CV ECHO MEAS - AO V2 MEAN: 78.1 CM/SEC
BH CV ECHO MEAS - AO V2 VTI: 25.4 CM
BH CV ECHO MEAS - BSA(HAYCOCK): 2.1 M^2
BH CV ECHO MEAS - BSA: 2 M^2
BH CV ECHO MEAS - BZI_BMI: 38.6 KILOGRAMS/M^2
BH CV ECHO MEAS - BZI_METRIC_HEIGHT: 160 CM
BH CV ECHO MEAS - BZI_METRIC_WEIGHT: 98.9 KG
BH CV ECHO MEAS - CI(CUBED): 4.8 L/MIN/M^2
BH CV ECHO MEAS - CI(TEICH): 4.5 L/MIN/M^2
BH CV ECHO MEAS - CO(CUBED): 9.5 L/MIN
BH CV ECHO MEAS - CO(TEICH): 9.1 L/MIN
BH CV ECHO MEAS - EDV(CUBED): 77.2 ML
BH CV ECHO MEAS - EDV(TEICH): 81.2 ML
BH CV ECHO MEAS - EF(CUBED): 77.6 %
BH CV ECHO MEAS - EF(TEICH): 70.1 %
BH CV ECHO MEAS - ESV(CUBED): 17.3 ML
BH CV ECHO MEAS - ESV(TEICH): 24.2 ML
BH CV ECHO MEAS - FS: 39.3 %
BH CV ECHO MEAS - IVS/LVPW: 0.88
BH CV ECHO MEAS - IVSD: 1 CM
BH CV ECHO MEAS - LA DIMENSION(2D): 4.8 CM
BH CV ECHO MEAS - LA DIMENSION: 4.8 CM
BH CV ECHO MEAS - LA/AO: 1.5
BH CV ECHO MEAS - LAT PEAK E' VEL: 7 CM/SEC
BH CV ECHO MEAS - LV IVRT: 0.12 SEC
BH CV ECHO MEAS - LV MASS(C)D: 155.3 GRAMS
BH CV ECHO MEAS - LV MASS(C)DI: 77.4 GRAMS/M^2
BH CV ECHO MEAS - LV MAX PG: 5.3 MMHG
BH CV ECHO MEAS - LV MEAN PG: 2.3 MMHG
BH CV ECHO MEAS - LV V1 MAX: 114.7 CM/SEC
BH CV ECHO MEAS - LV V1 MEAN: 67.2 CM/SEC
BH CV ECHO MEAS - LV V1 VTI: 25.3 CM
BH CV ECHO MEAS - LVIDD: 4.3 CM
BH CV ECHO MEAS - LVIDS: 2.6 CM
BH CV ECHO MEAS - LVPWD: 1.1 CM
BH CV ECHO MEAS - MED PEAK E' VEL: 6 CM/SEC
BH CV ECHO MEAS - MITRAL HR: 90.3 BPM
BH CV ECHO MEAS - MITRAL R-R: 0.66 SEC
BH CV ECHO MEAS - MM HR: 159.1 BPM
BH CV ECHO MEAS - MM R-R INT: 0.38 SEC
BH CV ECHO MEAS - MV A MAX VEL: 85.4 CM/SEC
BH CV ECHO MEAS - MV DEC SLOPE: 296.9 CM/SEC^2
BH CV ECHO MEAS - MV DEC TIME: 0.25 SEC
BH CV ECHO MEAS - MV E MAX VEL: 73.2 CM/SEC
BH CV ECHO MEAS - MV E/A: 0.86
BH CV ECHO MEAS - MV MAX PG: 3.2 MMHG
BH CV ECHO MEAS - MV MEAN PG: 1.4 MMHG
BH CV ECHO MEAS - MV V2 MAX: 89.7 CM/SEC
BH CV ECHO MEAS - MV V2 MEAN: 54.5 CM/SEC
BH CV ECHO MEAS - MV V2 VTI: 36.2 CM
BH CV ECHO MEAS - PA MAX PG (FULL): 0.84 MMHG
BH CV ECHO MEAS - PA MAX PG: 4.5 MMHG
BH CV ECHO MEAS - PA MEAN PG (FULL): 0.63 MMHG
BH CV ECHO MEAS - PA MEAN PG: 2.3 MMHG
BH CV ECHO MEAS - PA V2 MAX: 105.9 CM/SEC
BH CV ECHO MEAS - PA V2 MEAN: 70.3 CM/SEC
BH CV ECHO MEAS - PA V2 VTI: 25.8 CM
BH CV ECHO MEAS - PULM. HR: 165.1 BPM
BH CV ECHO MEAS - PULM. R-R: 0.36 SEC
BH CV ECHO MEAS - RAP SYSTOLE: 10 MMHG
BH CV ECHO MEAS - RV MAX PG: 3.6 MMHG
BH CV ECHO MEAS - RV MEAN PG: 1.7 MMHG
BH CV ECHO MEAS - RV V1 MAX: 95.5 CM/SEC
BH CV ECHO MEAS - RV V1 MEAN: 58.8 CM/SEC
BH CV ECHO MEAS - RV V1 VTI: 18.5 CM
BH CV ECHO MEAS - RVDD: 3.5 CM
BH CV ECHO MEAS - SI(AO): 105.2 ML/M^2
BH CV ECHO MEAS - SI(CUBED): 29.9 ML/M^2
BH CV ECHO MEAS - SI(TEICH): 28.4 ML/M^2
BH CV ECHO MEAS - SV(AO): 211.1 ML
BH CV ECHO MEAS - SV(CUBED): 59.9 ML
BH CV ECHO MEAS - SV(TEICH): 56.9 ML
BH CV ECHO MEAS - TR MAX VEL: 269.6 CM/SEC
BH CV ECHO MEASUREMENTS AVERAGE E/E' RATIO: 11.26
MAXIMAL PREDICTED HEART RATE: 157 BPM
STRESS TARGET HR: 133 BPM

## 2019-09-16 ENCOUNTER — TELEPHONE (OUTPATIENT)
Dept: CARDIOLOGY | Facility: CLINIC | Age: 64
End: 2019-09-16

## 2019-09-16 NOTE — TELEPHONE ENCOUNTER
Attempted to call patient with stress test results. Results to be addressed on follow up scheduled 10/23/19. Jennie Carrera MA      ----- Message from IVONNE Shah sent at 9/16/2019  1:27 PM EDT -----  Routine follow-up

## 2019-09-16 NOTE — TELEPHONE ENCOUNTER
Attempted to call patient with echo results. Echo to be addressed on follow up scheduled 10/23/19. Jennie Carrera MA      ----- Message from IVONNE Shah sent at 9/16/2019  1:27 PM EDT -----  Routine follow-up      Echocardiogram Findings     Left Ventricle Estimated EF appears to be in the range of 66 - 70%. Normal left ventricular cavity size noted. Left ventricular wall thickness is consistent with mild concentric hypertrophy. Left ventricular diastolic dysfunction is noted (grade I a w/high LAP) consistent with impaired relaxation.   Right Ventricle Normal right ventricular cavity size and septal motion noted.   Left Atrium Left atrial cavity size is mild-to-moderately dilated.   Right Atrium Right atrial cavity size is mildly dilated.   Aortic Valve The aortic valve is grossly normal in structure. The valve was poorly visualized but appears trileaflet. No aortic valve regurgitation is present. No aortic valve stenosis is present.   Mitral Valve The mitral valve is normal in structure. Trace mitral valve regurgitation is present. No significant mitral valve stenosis is present.   Tricuspid Valve The tricuspid valve is normal. No evidence of tricuspid valve stenosis is present. Trace to mild tricuspid valve regurgitation is present.   Pulmonic Valve The pulmonic valve is structurally normal. There is no significant pulmonic valve stenosis present. There is no significant pulmonic valve regurgitation present.   Greater Vessels No dilation of the aortic root is present. No dilation of the sinuses of Valsalva is present.   Pericardium The pericardium is normal. There is no evidence of pericardial effusion.

## 2019-09-19 ENCOUNTER — TELEPHONE (OUTPATIENT)
Dept: CARDIOLOGY | Facility: CLINIC | Age: 64
End: 2019-09-19

## 2019-09-19 NOTE — TELEPHONE ENCOUNTER
Patient returned call for test results. She was given stress and echo results. She will keep follow up as scheduled on 10/23/19. Jennie Carrera MA

## 2019-10-23 ENCOUNTER — OFFICE VISIT (OUTPATIENT)
Dept: CARDIOLOGY | Facility: CLINIC | Age: 64
End: 2019-10-23

## 2019-10-23 VITALS
OXYGEN SATURATION: 98 % | BODY MASS INDEX: 39.87 KG/M2 | WEIGHT: 225 LBS | HEIGHT: 63 IN | DIASTOLIC BLOOD PRESSURE: 85 MMHG | SYSTOLIC BLOOD PRESSURE: 162 MMHG | HEART RATE: 65 BPM

## 2019-10-23 DIAGNOSIS — I25.10 CORONARY ARTERY DISEASE INVOLVING NATIVE CORONARY ARTERY OF NATIVE HEART WITHOUT ANGINA PECTORIS: ICD-10-CM

## 2019-10-23 DIAGNOSIS — I10 ESSENTIAL HYPERTENSION: ICD-10-CM

## 2019-10-23 DIAGNOSIS — R06.02 SHORTNESS OF BREATH: Primary | ICD-10-CM

## 2019-10-23 PROCEDURE — 99213 OFFICE O/P EST LOW 20 MIN: CPT | Performed by: PHYSICIAN ASSISTANT

## 2019-10-23 NOTE — PROGRESS NOTES
"Problem list     Subjective   Kathrine Bella is a 64 y.o. female     Chief Complaint   Patient presents with   • Chest Pain     Here for a follow up on testing    • Palpitations   • Shortness of Breath     Problem list  1.  Nonobstructive coronary artery disease  1.1 cardiac catheterization in the distant past [facility] which was nonobstructive per patient report, inadequate data  1.2 cardiac catheterization in [place] approximately 5 years ago and 2014 that was nonobstructive with moderate disease noted in 2 vessels, inadequate data  1.3 stress test September 2019 with no evidence of ischemia preserved LV function  2.  Preserved systolic function  3.  Hypertension  4.  Dyslipidemia  5.  Chronic tobacco use  6.  Diabetes mellitus    HPI    Patient is a 64-year-old female who presents to the office for follow-up.  Patient is here today to review stress test and echocardiogram.  Patient describes that she is doing well.  She describes some pressure that seems to occur on the abdominal region that radiates around to her back.  Patient describes feeling somewhat congested recently and has had a productive cough of green sputum.  She said no fever or chills.  She describes she will feel pressure from that but does not describe any chest pressure.  She has shortness of breath that is mild.  No progressive dyspnea.  No PND orthopnea.    Occasionally she will notice a \"fluttering\".  No associated dizziness presyncope or syncope.  Otherwise is doing well      Outpatient Encounter Medications as of 10/23/2019   Medication Sig Dispense Refill   • aspirin 81 MG EC tablet Take 1 tablet by mouth Daily. 30 tablet 11   • atorvastatin (LIPITOR) 80 MG tablet Take 80 mg by mouth every night at bedtime.  0   • escitalopram (LEXAPRO) 20 MG tablet Take 20 mg by mouth Daily.     • esomeprazole (nexIUM) 40 MG capsule Daily.     • gabapentin (NEURONTIN) 800 MG tablet Take 800 mg by mouth 2 (Two) Times a Day.  0   • " HYDROcodone-acetaminophen (NORCO) 5-325 MG per tablet Take 1 tablet by mouth Every 8 (Eight) Hours As Needed. for pain  0   • levothyroxine (SYNTHROID, LEVOTHROID) 125 MCG tablet Take 125 mcg by mouth Daily.  0   • lisinopril (PRINIVIL,ZESTRIL) 5 MG tablet Take 1 tablet by mouth Daily. (Patient taking differently: Take 10 mg by mouth Daily.) 30 tablet 11   • meloxicam (MOBIC) 7.5 MG tablet Take 7.5 mg by mouth Daily.     • metFORMIN ER (GLUCOPHAGE-XR) 500 MG 24 hr tablet 1,000 mg 2 (Two) Times a Day.  0   • metoprolol succinate XL (TOPROL-XL) 25 MG 24 hr tablet Take 1 tablet by mouth Daily. 30 tablet 11   • nitroglycerin (NITROSTAT) 0.4 MG SL tablet 1 under the tongue as needed for angina, may repeat q5mins for up three doses 25 tablet 2   • PROAIR  (90 Base) MCG/ACT inhaler As Needed.  0     No facility-administered encounter medications on file as of 10/23/2019.        Patient has no known allergies.    Past Medical History:   Diagnosis Date   • Asthma    • Coronary artery disease    • Diabetes mellitus (CMS/HCC)    • Hyperlipidemia    • Hypertension    • Myocardial infarction (CMS/HCC)    • Thyroid disease        Social History     Socioeconomic History   • Marital status: Legally      Spouse name: Not on file   • Number of children: Not on file   • Years of education: Not on file   • Highest education level: Not on file   Tobacco Use   • Smoking status: Current Every Day Smoker   • Smokeless tobacco: Never Used   Substance and Sexual Activity   • Alcohol use: No   • Drug use: No   • Sexual activity: Defer       Family History   Problem Relation Age of Onset   • Heart disease Mother    • Heart attack Mother    • Hypertension Mother    • Heart disease Father    • Heart attack Father    • Hypertension Father    • Heart disease Brother        Review of Systems   Constitutional: Negative.  Negative for chills, fatigue and fever.   HENT: Negative.    Eyes: Positive for visual disturbance (glasses prn  ").   Respiratory: Positive for shortness of breath (SOA with exertion such as climbing stairs ). Negative for chest tightness.    Cardiovascular: Positive for palpitations (Rare flutters ). Negative for chest pain and leg swelling.   Gastrointestinal: Negative.    Endocrine: Negative.  Negative for cold intolerance and heat intolerance.   Genitourinary: Negative.    Musculoskeletal: Positive for arthralgias (joints ), back pain (pain in entire back ) and gait problem (feels unsteady on her feet when she first stands up ).   Skin: Negative.  Negative for rash and wound.   Allergic/Immunologic: Negative.  Negative for environmental allergies and food allergies.   Neurological: Negative for dizziness, weakness and light-headedness.   Hematological: Negative.  Does not bruise/bleed easily.   Psychiatric/Behavioral: Positive for sleep disturbance (Has awaken with smothering a couple of times recently ).   All other systems reviewed and are negative.      Objective   Vitals:    10/23/19 0858   BP: 162/85   BP Location: Left arm   Patient Position: Sitting   Pulse: 65   SpO2: 98%   Weight: 102 kg (225 lb)   Height: 160 cm (63\")      /85 (BP Location: Left arm, Patient Position: Sitting)   Pulse 65   Ht 160 cm (63\")   Wt 102 kg (225 lb)   SpO2 98%   BMI 39.86 kg/m²     Lab Results (most recent)     None          Physical Exam   Constitutional: She is oriented to person, place, and time. She appears well-developed and well-nourished. No distress.   HENT:   Head: Normocephalic and atraumatic.   Eyes: EOM are normal. Pupils are equal, round, and reactive to light.   Neck: No JVD present.   Cardiovascular: Normal rate, regular rhythm, normal heart sounds and intact distal pulses. Exam reveals no gallop and no friction rub.   No murmur heard.  Pulmonary/Chest: Effort normal and breath sounds normal. No respiratory distress. She has no wheezes. She has no rales. She exhibits no tenderness.   Musculoskeletal: Normal " range of motion. She exhibits no edema.   Neurological: She is alert and oriented to person, place, and time. No cranial nerve deficit.   Skin: Skin is warm and dry. No rash noted. No erythema. No pallor.   Psychiatric: She has a normal mood and affect. Her behavior is normal.   Nursing note and vitals reviewed.      Procedure   Procedures       Assessment/Plan     Problems Addressed this Visit        Cardiovascular and Mediastinum    Essential hypertension    Coronary artery disease involving native coronary artery of native heart without angina pectoris       Respiratory    Shortness of breath - Primary          Recommendation  1.  Patient with coronary artery disease.  No symptoms of angina, failure or malignant arrhythmia.  2.  Patient has mild dyspnea.  She describes being congested.  Lung examination appears normal.  She is not hypoxic.  She will follow with primary.  3.  Blood pressure is well controlled at this time.  It is elevated today but she describes being stressed from current events this morning.  4.  For now, we will continue aspirin and statin therapy.  I do recommend smoking cessation.  We will see her back for follow-up in 6 months.  If symptoms were to change or worsen, she will call our office.  She will follow with primary as scheduled           Kathrine Bella is a current cigarettes user.  She currently smokes 1 pack of cigarettes per day for a duration of 49 years. I have educated her on the risk of diseases from using tobacco products such as cancer, COPD and heart diease.     I advised her to quit and she is not willing to quit.    Patient's Body mass index is 39.86 kg/m². BMI is above normal parameters. Recommendations include: educational material and referral to primary care.       Electronically signed by:

## 2019-10-23 NOTE — PATIENT INSTRUCTIONS
Steps to Quit Smoking    Smoking tobacco can be bad for your health. It can also affect almost every organ in your body. Smoking puts you and people around you at risk for many serious long-lasting (chronic) diseases. Quitting smoking is hard, but it is one of the best things that you can do for your health. It is never too late to quit.  What are the benefits of quitting smoking?  When you quit smoking, you lower your risk for getting serious diseases and conditions. They can include:  · Lung cancer or lung disease.  · Heart disease.  · Stroke.  · Heart attack.  · Not being able to have children (infertility).  · Weak bones (osteoporosis) and broken bones (fractures).  If you have coughing, wheezing, and shortness of breath, those symptoms may get better when you quit. You may also get sick less often. If you are pregnant, quitting smoking can help to lower your chances of having a baby of low birth weight.  What can I do to help me quit smoking?  Talk with your doctor about what can help you quit smoking. Some things you can do (strategies) include:  · Quitting smoking totally, instead of slowly cutting back how much you smoke over a period of time.  · Going to in-person counseling. You are more likely to quit if you go to many counseling sessions.  · Using resources and support systems, such as:  ? Online chats with a counselor.  ? Phone quitlines.  ? Printed self-help materials.  ? Support groups or group counseling.  ? Text messaging programs.  ? Mobile phone apps or applications.  · Taking medicines. Some of these medicines may have nicotine in them. If you are pregnant or breastfeeding, do not take any medicines to quit smoking unless your doctor says it is okay. Talk with your doctor about counseling or other things that can help you.  Talk with your doctor about using more than one strategy at the same time, such as taking medicines while you are also going to in-person counseling. This can help make  quitting easier.  What things can I do to make it easier to quit?  Quitting smoking might feel very hard at first, but there is a lot that you can do to make it easier. Take these steps:  · Talk to your family and friends. Ask them to support and encourage you.  · Call phone quitlines, reach out to support groups, or work with a counselor.  · Ask people who smoke to not smoke around you.  · Avoid places that make you want (trigger) to smoke, such as:  ? Bars.  ? Parties.  ? Smoke-break areas at work.  · Spend time with people who do not smoke.  · Lower the stress in your life. Stress can make you want to smoke. Try these things to help your stress:  ? Getting regular exercise.  ? Deep-breathing exercises.  ? Yoga.  ? Meditating.  ? Doing a body scan. To do this, close your eyes, focus on one area of your body at a time from head to toe, and notice which parts of your body are tense. Try to relax the muscles in those areas.  · Download or buy apps on your mobile phone or tablet that can help you stick to your quit plan. There are many free apps, such as QuitGuide from the CDC (Centers for Disease Control and Prevention). You can find more support from smokefree.gov and other websites.  This information is not intended to replace advice given to you by your health care provider. Make sure you discuss any questions you have with your health care provider.  Document Released: 10/14/2010 Document Revised: 08/15/2017 Document Reviewed: 05/03/2016  [employer] Interactive Patient Education © 2019 [employer]  Fat and Cholesterol Restricted Eating Plan  Getting too much fat and cholesterol in your diet may cause health problems. Choosing the right foods helps keep your fat and cholesterol at normal levels. This can keep you from getting certain diseases.  Your doctor may recommend an eating plan that includes:  · Total fat: ______% or less of total calories a day.  · Saturated fat: ______% or less of total calories a  "day.  · Cholesterol: less than _________mg a day.  · Fiber: ______g a day.  What are tips for following this plan?  General tips    · Work with your doctor to lose weight if you need to.  · Avoid:  ? Foods with added sugar.  ? Fried foods.  ? Foods with partially hydrogenated oils.  · Limit alcohol intake to no more than 1 drink a day for nonpregnant women and 2 drinks a day for men. One drink equals 12 oz of beer, 5 oz of wine, or 1½ oz of hard liquor.  Reading food labels  · Check food labels for:  ? Trans fats.  ? Partially hydrogenated oils.  ? Saturated fat (g) in each serving.  ? Cholesterol (mg) in each serving.  ? Fiber (g) in each serving.  · Choose foods with healthy fats, such as:  ? Monounsaturated fats.  ? Polyunsaturated fats.  ? Omega-3 fats.  · Choose grain products that have whole grains. Look for the word \"whole\" as the first word in the ingredient list.  Cooking  · Cook foods using low-fat methods. These include baking, boiling, grilling, and broiling.  · Eat more home-cooked foods. Eat at restaurants and buffets less often.  · Avoid cooking using saturated fats, such as butter, cream, palm oil, palm kernel oil, and coconut oil.  Meal planning    · At meals, divide your plate into four equal parts:  ? Fill one-half of your plate with vegetables and green salads.  ? Fill one-fourth of your plate with whole grains.  ? Fill one-fourth of your plate with low-fat (lean) protein foods.  · Eat fish that is high in omega-3 fats at least two times a week. This includes mackerel, tuna, sardines, and salmon.  · Eat foods that are high in fiber, such as whole grains, beans, apples, broccoli, carrots, peas, and barley.  Recommended foods  Grains  · Whole grains, such as whole wheat or whole grain breads, crackers, cereals, and pasta. Unsweetened oatmeal, bulgur, barley, quinoa, or brown rice. Corn or whole wheat flour tortillas.  Vegetables  · Fresh or frozen vegetables (raw, steamed, roasted, or grilled). " Green salads.  Fruits  · All fresh, canned (in natural juice), or frozen fruits.  Meats and other protein foods  · Ground beef (85% or leaner), grass-fed beef, or beef trimmed of fat. Skinless chicken or turkey. Ground chicken or turkey. Pork trimmed of fat. All fish and seafood. Egg whites. Dried beans, peas, or lentils. Unsalted nuts or seeds. Unsalted canned beans. Nut butters without added sugar or oil.  Dairy  · Low-fat or nonfat dairy products, such as skim or 1% milk, 2% or reduced-fat cheeses, low-fat and fat-free ricotta or cottage cheese, or plain low-fat and nonfat yogurt.  Fats and oils  · Tub margarine without trans fats. Light or reduced-fat mayonnaise and salad dressings. Avocado. Olive, canola, sesame, or safflower oils.  The items listed above may not be a complete list of recommended foods or beverages. Contact your dietitian for more options.  The items listed above may not be a complete list of foods and beverages [you/your child] can eat. Contact a dietitian for more information.  Foods to avoid  Grains  · White bread. White pasta. White rice. Cornbread. Bagels, pastries, and croissants. Crackers and snack foods that contain trans fat and hydrogenated oils.  Vegetables  · Vegetables cooked in cheese, cream, or butter sauce. Fried vegetables.  Fruits  · Canned fruit in heavy syrup. Fruit in cream or butter sauce. Fried fruit.  Meats and other protein foods  · Fatty cuts of meat. Ribs, chicken wings, bullock, sausage, bologna, salami, chitterlings, fatback, hot dogs, bratwurst, and packaged lunch meats. Liver and organ meats. Whole eggs and egg yolks. Chicken and turkey with skin. Fried meat.  Dairy  · Whole or 2% milk, cream, half-and-half, and cream cheese. Whole milk cheeses. Whole-fat or sweetened yogurt. Full-fat cheeses. Nondairy creamers and whipped toppings. Processed cheese, cheese spreads, and cheese curds.  Beverages  · Alcohol. Sugar-sweetened drinks such as sodas, lemonade, and fruit  drinks.  Fats and oils  · Butter, stick margarine, lard, shortening, ghee, or bullock fat. Coconut, palm kernel, and palm oils.  Sweets and desserts  · Corn syrup, sugars, honey, and molasses. Candy. Jam and jelly. Syrup. Sweetened cereals. Cookies, pies, cakes, donuts, muffins, and ice cream.  The items listed above may not be a complete list of foods and beverages to avoid. Contact your dietitian for more information.  The items listed above may not be a complete list of foods and beverages [you/your child] should avoid. Contact a dietitian for more information.  Summary  · Choosing the right foods helps keep your fat and cholesterol at normal levels. This can keep you from getting certain diseases.  · At meals, fill one-half of your plate with vegetables and green salads.  · Eat high-fiber foods, like whole grains, beans, apples, carrots, peas, and barley.  · Limit added sugar, saturated fats, alcohol, and fried foods.  This information is not intended to replace advice given to you by your health care provider. Make sure you discuss any questions you have with your health care provider.  Document Released: 06/18/2013 Document Revised: 09/04/2018 Document Reviewed: 09/04/2018  [employer] Interactive Patient Education © 2019 [employer]  BMI for Adults    Body mass index (BMI) is a number that is calculated from a person's weight and height. BMI may help to estimate how much of a person's weight is composed of fat. BMI can help identify those who may be at higher risk for certain medical problems.  How is BMI used with adults?  BMI is used as a screening tool to identify possible weight problems. It is used to check whether a person is obese, overweight, healthy weight, or underweight.  How is BMI calculated?  BMI measures your weight and compares it to your height. This can be done either in English ([place] or metric measurements. Note that charts are available to help you find your BMI quickly and easily without  "having to do these calculations yourself.  To calculate your BMI in English ([place] measurements, your health care provider will:  1. Measure your weight in pounds (lb).  2. Multiply the number of pounds by 703.  ? For example, for a person who weighs 180 lb, multiply that number by 703, which equals 126,540.  3. Measure your height in inches (in). Then multiply that number by itself to get a measurement called \"inches squared.\"  ? For example, for a person who is 70 in tall, the \"inches squared\" measurement is 70 in x 70 in, which equals 4900 inches squared.  4. Divide the total from Step 2 (number of lb x 703) by the total from Step 3 (inches squared): 126,540 ÷ 4900 = 25.8. This is your BMI.  To calculate your BMI in metric measurements, your health care provider will:  1. Measure your weight in kilograms (kg).  2. Measure your height in meters (m). Then multiply that number by itself to get a measurement called \"meters squared.\"  ? For example, for a person who is 1.75 m tall, the \"meters squared\" measurement is 1.75 m x 1.75 m, which is equal to 3.1 meters squared.  3. Divide the number of kilograms (your weight) by the meters squared number. In this example: 70 ÷ 3.1 = 22.6. This is your BMI.  How is BMI interpreted?  To interpret your results, your health care provider will use BMI charts to identify whether you are underweight, normal weight, overweight, or obese. The following guidelines will be used:  · Underweight: BMI less than 18.5.  · Normal weight: BMI between 18.5 and 24.9.  · Overweight: BMI between 25 and 29.9.  · Obese: BMI of 30 and above.  Please note:  · Weight includes both fat and muscle, so someone with a muscular build, such as an athlete, may have a BMI that is higher than 24.9. In cases like these, BMI is not an accurate measure of body fat.  · To determine if excess body fat is the cause of a BMI of 25 or higher, further assessments may need to be done by a health care provider.  · BMI is " usually interpreted in the same way for men and women.  Why is BMI a useful tool?  BMI is useful in two ways:  · Identifying a weight problem that may be related to a medical condition, or that may increase the risk for medical problems.  · Promoting lifestyle and diet changes in order to reach a healthy weight.  Summary  · Body mass index (BMI) is a number that is calculated from a person's weight and height.  · BMI may help to estimate how much of a person's weight is composed of fat. BMI can help identify those who may be at higher risk for certain medical problems.  · BMI can be measured using English measurements or metric measurements.  · To interpret your results, your health care provider will use BMI charts to identify whether you are underweight, normal weight, overweight, or obese.  This information is not intended to replace advice given to you by your health care provider. Make sure you discuss any questions you have with your health care provider.  Document Released: 08/29/2005 Document Revised: 10/31/2018 Document Reviewed: 10/31/2018  [employer] Interactive Patient Education © 2019 [employer]

## 2020-05-04 ENCOUNTER — OFFICE VISIT (OUTPATIENT)
Dept: CARDIOLOGY | Facility: CLINIC | Age: 65
End: 2020-05-04

## 2020-05-04 VITALS
DIASTOLIC BLOOD PRESSURE: 80 MMHG | SYSTOLIC BLOOD PRESSURE: 122 MMHG | BODY MASS INDEX: 38.98 KG/M2 | HEART RATE: 84 BPM | HEIGHT: 63 IN | WEIGHT: 220 LBS

## 2020-05-04 DIAGNOSIS — R07.9 CHEST PAIN, UNSPECIFIED TYPE: ICD-10-CM

## 2020-05-04 DIAGNOSIS — R06.02 SHORTNESS OF BREATH: ICD-10-CM

## 2020-05-04 DIAGNOSIS — I25.10 CORONARY ARTERY DISEASE INVOLVING NATIVE CORONARY ARTERY OF NATIVE HEART WITHOUT ANGINA PECTORIS: Primary | ICD-10-CM

## 2020-05-04 PROCEDURE — 99441 PR PHYS/QHP TELEPHONE EVALUATION 5-10 MIN: CPT | Performed by: PHYSICIAN ASSISTANT

## 2020-05-04 NOTE — PATIENT INSTRUCTIONS
How to Quarantine at Home  Information for Patients and Families    These instructions are for people with confirmed or suspected COVID-19 who do not need to be hospitalized and those with confirmed COVID-19 who were hospitalized and discharged to care for themselves at home.    If you were tested through the Health Department  The Health Department will monitor your wellbeing.  If it is determined that you do not need to be hospitalized and can be isolated at home, you will be monitored by staff from your local or state health department.     If you were tested through a Commercial [facility]  You will need to monitor yourself and report changes in your symptoms to your doctor.  See the section below called Monitor Your Symptoms.    Follow these steps until a healthcare provider or local or state health department says you can return to your normal activities.    Stay home except to get medical care  • Restrict activities outside your home, except for getting medical care.   • Do not go to work, school, or public areas.   • Avoid using public transportation, ride-sharing, or taxis.    Separate yourself from other people and animals in your home  People  As much as possible, you should stay in a specific room and away from other people in your home. Also, you should use a separate bathroom, if available.    Animals  You should restrict contact with pets and other animals while you are sick with COVID-19, just like you would around other people. When possible, have another member of your household care for your animals while you are sick. If you are sick with COVID-19, avoid contact with your pet, including petting, snuggling, being kissed or licked, and sharing food. If you must care for your pet or be around animals while you are sick, wash your hands before and after you interact with pets and wear a facemask. See COVID-19 and Animals for more information.    Call ahead before visiting your doctor  If you have a medical  appointment, call the healthcare provider and tell them that you have or may have COVID-19. This information will help the healthcare provider’s office take steps to keep other people from getting infected or exposed.    Wear a facemask  You should wear a facemask when you are around other people (e.g., sharing a room or vehicle) or pets and before you enter a healthcare provider’s office.     If you are not able to wear a facemask (for example, because it causes trouble breathing), then people who live with you should not stay in the same room with you, or they should wear a facemask if they enter your room.    Cover your coughs and sneezes  • Cover your mouth and nose with a tissue when you cough or sneeze.   • Throw used tissues in a lined trash can.   • Immediately wash your hands with soap and water for at least 20 seconds or, if soap and water are not available, clean your hands with an alcohol-based hand  that contains at least 60% alcohol.    Clean your hands often  • Wash your hands often with soap and water for at least 20 seconds, especially after blowing your nose, coughing, or sneezing; going to the bathroom; and before eating or preparing food.     • If soap and water are not readily available, use an alcohol-based hand  with at least 60% alcohol, covering all surfaces of your hands and rubbing them together until they feel dry.    • Soap and water are the best option if hands are visibly dirty. Avoid touching your eyes, nose, and mouth with unwashed hands.    Avoid sharing personal household items  • You should not share dishes, drinking glasses, cups, eating utensils, towels, or bedding with other people or pets in your home.   • After using these items, they should be washed thoroughly with soap and water.    Clean all “high-touch” surfaces everyday  • High touch surfaces include counters, tabletops, doorknobs, bathroom fixtures, toilets, phones, keyboards, tablets, and bedside  tables.   • Also, clean any surfaces that may have blood, stool, or body fluids on them.   • Use a household cleaning spray or wipe, according to the label instructions. Labels contain instructions for safe and effective use of the cleaning product, including precautions you should take when applying the product, such as wearing gloves and making sure you have good ventilation during use of the product.    Monitor your symptoms  • Seek prompt medical attention if your illness is worsening (e.g., difficulty breathing).   • Before seeking care, call your healthcare provider and tell them that you have, or are being evaluated for, COVID-19.   • Put on a facemask before you enter the facility.     • These steps will help the healthcare provider’s office to keep other people in the office or waiting room from getting infected or exposed.   • Persons who are placed under active monitoring or facilitated self-monitoring should follow instructions provided by their local health department or occupational health professionals, as appropriate.  • If you have a medical emergency and need to call 911, notify the dispatch personnel that you have, or are being evaluated for COVID-19. If possible, put on a facemask before emergency medical services arrive.    Discontinuing home isolation  Patients with confirmed COVID-19 should remain under home isolation precautions until the risk of secondary transmission to others is thought to be low. The decision to discontinue home isolation precautions should be made on a case-by-case basis, in consultation with healthcare providers and state and local health departments.    The below content are for household members, intimate partners, and caregivers of a patient with symptomatic laboratory-confirmed COVID-19 or a patient under investigation:    Household members, intimate partners, and caregivers may have close contact with a person with symptomatic, laboratory-confirmed COVID-19 or a  person under investigation.     Close contacts should monitor their health; they should call their healthcare provider right away if they develop symptoms suggestive of COVID-19 (e.g., fever, cough, shortness of breath)     Close contacts should also follow these recommendations:  • Make sure that you understand and can help the patient follow their healthcare provider’s instructions for medication(s) and care. You should help the patient with basic needs in the home and provide support for getting groceries, prescriptions, and other personal needs.  • Monitor the patient’s symptoms. If the patient is getting sicker, call his or her healthcare provider and tell them that the patient has laboratory-confirmed COVID-19. This will help the healthcare provider’s office take steps to keep other people in the office or waiting room from getting infected. Ask the healthcare provider to call the local or [facility] health department for additional guidance. If the patient has a medical emergency and you need to call 911, notify the dispatch personnel that the patient has, or is being evaluated for COVID-19.  • Household members should stay in another room or be  from the patient as much as possible. Household members should use a separate bedroom and bathroom, if available.  • Prohibit visitors who do not have an essential need to be in the home.  • Household members should care for any pets in the home. Do not handle pets or other animals while sick.  For more information, see COVID-19 and Animals.  • Make sure that shared spaces in the home have good air flow, such as by an air conditioner or an opened window, weather permitting.  • Perform hand hygiene frequently. Wash your hands often with soap and water for at least 20 seconds or use an alcohol-based hand  that contains 60 to 95% alcohol, covering all surfaces of your hands and rubbing them together until they feel dry. Soap and water should be used  preferentially if hands are visibly dirty.  • Avoid touching your eyes, nose, and mouth with unwashed hands.  • The patient should wear a facemask when you are around other people. If the patient is not able to wear a facemask (for example, because it causes trouble breathing), you, as the caregiver, should wear a mask when you are in the same room as the patient.  • Wear a disposable facemask and gloves when you touch or have contact with the patient’s blood, stool, or body fluids, such as saliva, sputum, nasal mucus, vomit, or urine.   o Throw out disposable facemasks and gloves after using them. Do not reuse.  o When removing personal protective equipment, first remove and dispose of gloves. Then, immediately clean your hands with soap and water or alcohol-based hand . Next, remove and dispose of facemask, and immediately clean your hands again with soap and water or alcohol-based hand .  • Avoid sharing household items with the patient. You should not share dishes, drinking glasses, cups, eating utensils, towels, bedding, or other items. After the patient uses these items, you should wash them thoroughly (see below “Wash laundry thoroughly”).  • Clean all “high-touch” surfaces, such as counters, tabletops, doorknobs, bathroom fixtures, toilets, phones, keyboards, tablets, and bedside tables, every day. Also, clean any surfaces that may have blood, stool, or body fluids on them.   o Use a household cleaning spray or wipe, according to the label instructions. Labels contain instructions for safe and effective use of the cleaning product including precautions you should take when applying the product, such as wearing gloves and making sure you have good ventilation during use of the product.  • Wash laundry thoroughly.   o Immediately remove and wash clothes or bedding that have blood, stool, or body fluids on them.  o Wear disposable gloves while handling soiled items and keep soiled items away  from your body. Clean your hands (with soap and water or an alcohol-based hand ) immediately after removing your gloves.  o Read and follow directions on labels of laundry or clothing items and detergent. In general, using a normal laundry detergent according to washing machine instructions and dry thoroughly using the warmest temperatures recommended on the clothing label.  • Place all used disposable gloves, facemasks, and other contaminated items in a lined container before disposing of them with other household waste. Clean your hands (with soap and water or an alcohol-based hand ) immediately after handling these items. Soap and water should be used preferentially if hands are visibly dirty.  • Discuss any additional questions with your state or local health department or healthcare provider.    Adapted from information provided by the Centers for Disease Control and Prevention.  For more information, visit https://www.cdc.gov/coronavirus/2019-ncov/hcp/guidance-prevent-spread.htmlSteps to Quit Smoking    Smoking tobacco can be bad for your health. It can also affect almost every organ in your body. Smoking puts you and people around you at risk for many serious long-lasting (chronic) diseases. Quitting smoking is hard, but it is one of the best things that you can do for your health. It is never too late to quit.  What are the benefits of quitting smoking?  When you quit smoking, you lower your risk for getting serious diseases and conditions. They can include:  · Lung cancer or lung disease.  · Heart disease.  · Stroke.  · Heart attack.  · Not being able to have children (infertility).  · Weak bones (osteoporosis) and broken bones (fractures).  If you have coughing, wheezing, and shortness of breath, those symptoms may get better when you quit. You may also get sick less often. If you are pregnant, quitting smoking can help to lower your chances of having a baby of low birth weight.  What can  I do to help me quit smoking?  Talk with your doctor about what can help you quit smoking. Some things you can do (strategies) include:  · Quitting smoking totally, instead of slowly cutting back how much you smoke over a period of time.  · Going to in-person counseling. You are more likely to quit if you go to many counseling sessions.  · Using resources and support systems, such as:  ? Online chats with a counselor.  ? Phone quitlines.  ? Printed self-help materials.  ? Support groups or group counseling.  ? Text messaging programs.  ? Mobile phone apps or applications.  · Taking medicines. Some of these medicines may have nicotine in them. If you are pregnant or breastfeeding, do not take any medicines to quit smoking unless your doctor says it is okay. Talk with your doctor about counseling or other things that can help you.  Talk with your doctor about using more than one strategy at the same time, such as taking medicines while you are also going to in-person counseling. This can help make quitting easier.  What things can I do to make it easier to quit?  Quitting smoking might feel very hard at first, but there is a lot that you can do to make it easier. Take these steps:  · Talk to your family and friends. Ask them to support and encourage you.  · Call phone quitlines, reach out to support groups, or work with a counselor.  · Ask people who smoke to not smoke around you.  · Avoid places that make you want (trigger) to smoke, such as:  ? Bars.  ? Parties.  ? Smoke-break areas at work.  · Spend time with people who do not smoke.  · Lower the stress in your life. Stress can make you want to smoke. Try these things to help your stress:  ? Getting regular exercise.  ? Deep-breathing exercises.  ? Yoga.  ? Meditating.  ? Doing a body scan. To do this, close your eyes, focus on one area of your body at a time from head to toe, and notice which parts of your body are tense. Try to relax the muscles in those  areas.  · Download or buy apps on your mobile phone or tablet that can help you stick to your quit plan. There are many free apps, such as QuitGuide from the CDC (Centers for Disease Control and Prevention). You can find more support from smokefree.gov and other websites.  This information is not intended to replace advice given to you by your health care provider. Make sure you discuss any questions you have with your health care provider.  Document Released: 10/14/2010 Document Revised: 08/15/2017 Document Reviewed: 05/03/2016  [employer] Interactive Patient Education © 2020 [employer]  Fat and Cholesterol Restricted Eating Plan  Getting too much fat and cholesterol in your diet may cause health problems. Choosing the right foods helps keep your fat and cholesterol at normal levels. This can keep you from getting certain diseases.  Your doctor may recommend an eating plan that includes:  · Total fat: ______% or less of total calories a day.  · Saturated fat: ______% or less of total calories a day.  · Cholesterol: less than _________mg a day.  · Fiber: ______g a day.  What are tips for following this plan?  Meal planning  · At meals, divide your plate into four equal parts:  ? Fill one-half of your plate with vegetables and green salads.  ? Fill one-fourth of your plate with whole grains.  ? Fill one-fourth of your plate with low-fat (lean) protein foods.  · Eat fish that is high in omega-3 fats at least two times a week. This includes mackerel, tuna, sardines, and salmon.  · Eat foods that are high in fiber, such as whole grains, beans, apples, broccoli, carrots, peas, and barley.  General tips    · Work with your doctor to lose weight if you need to.  · Avoid:  ? Foods with added sugar.  ? Fried foods.  ? Foods with partially hydrogenated oils.  · Limit alcohol intake to no more than 1 drink a day for nonpregnant women and 2 drinks a day for men. One drink equals 12 oz of beer, 5 oz of wine, or 1½ oz of hard  "liquor.  Reading food labels  · Check food labels for:  ? Trans fats.  ? Partially hydrogenated oils.  ? Saturated fat (g) in each serving.  ? Cholesterol (mg) in each serving.  ? Fiber (g) in each serving.  · Choose foods with healthy fats, such as:  ? Monounsaturated fats.  ? Polyunsaturated fats.  ? Omega-3 fats.  · Choose grain products that have whole grains. Look for the word \"whole\" as the first word in the ingredient list.  Cooking  · Cook foods using low-fat methods. These include baking, boiling, grilling, and broiling.  · Eat more home-cooked foods. Eat at restaurants and buffets less often.  · Avoid cooking using saturated fats, such as butter, cream, palm oil, palm kernel oil, and coconut oil.  Recommended foods    Fruits  · All fresh, canned (in natural juice), or frozen fruits.  Vegetables  · Fresh or frozen vegetables (raw, steamed, roasted, or grilled). Green salads.  Grains  · Whole grains, such as whole wheat or whole grain breads, crackers, cereals, and pasta. Unsweetened oatmeal, bulgur, barley, quinoa, or brown rice. Corn or whole wheat flour tortillas.  Meats and other protein foods  · Ground beef (85% or leaner), grass-fed beef, or beef trimmed of fat. Skinless chicken or turkey. Ground chicken or turkey. Pork trimmed of fat. All fish and seafood. Egg whites. Dried beans, peas, or lentils. Unsalted nuts or seeds. Unsalted canned beans. Nut butters without added sugar or oil.  Dairy  · Low-fat or nonfat dairy products, such as skim or 1% milk, 2% or reduced-fat cheeses, low-fat and fat-free ricotta or cottage cheese, or plain low-fat and nonfat yogurt.  Fats and oils  · Tub margarine without trans fats. Light or reduced-fat mayonnaise and salad dressings. Avocado. Olive, canola, sesame, or safflower oils.  The items listed above may not be a complete list of foods and beverages you can eat. Contact a dietitian for more information.  Foods to avoid  Fruits  · Canned fruit in heavy syrup. Fruit " in cream or butter sauce. Fried fruit.  Vegetables  · Vegetables cooked in cheese, cream, or butter sauce. Fried vegetables.  Grains  · White bread. White pasta. White rice. Cornbread. Bagels, pastries, and croissants. Crackers and snack foods that contain trans fat and hydrogenated oils.  Meats and other protein foods  · Fatty cuts of meat. Ribs, chicken wings, bullock, sausage, bologna, salami, chitterlings, fatback, hot dogs, bratwurst, and packaged lunch meats. Liver and organ meats. Whole eggs and egg yolks. Chicken and turkey with skin. Fried meat.  Dairy  · Whole or 2% milk, cream, half-and-half, and cream cheese. Whole milk cheeses. Whole-fat or sweetened yogurt. Full-fat cheeses. Nondairy creamers and whipped toppings. Processed cheese, cheese spreads, and cheese curds.  Beverages  · Alcohol. Sugar-sweetened drinks such as sodas, lemonade, and fruit drinks.  Fats and oils  · Butter, stick margarine, lard, shortening, ghee, or bullock fat. Coconut, palm kernel, and palm oils.  Sweets and desserts  · Corn syrup, sugars, honey, and molasses. Candy. Jam and jelly. Syrup. Sweetened cereals. Cookies, pies, cakes, donuts, muffins, and ice cream.  The items listed above may not be a complete list of foods and beverages you should avoid. Contact a dietitian for more information.  Summary  · Choosing the right foods helps keep your fat and cholesterol at normal levels. This can keep you from getting certain diseases.  · At meals, fill one-half of your plate with vegetables and green salads.  · Eat high-fiber foods, like whole grains, beans, apples, carrots, peas, and barley.  · Limit added sugar, saturated fats, alcohol, and fried foods.  This information is not intended to replace advice given to you by your health care provider. Make sure you discuss any questions you have with your health care provider.  Document Released: 06/18/2013 Document Revised: 08/21/2019 Document Reviewed: 09/04/2018  [employer]  "Patient Education © 2020 [employer]  BMI for Adults    Body mass index (BMI) is a number that is calculated from a person's weight and height. BMI may help to estimate how much of a person's weight is composed of fat. BMI can help identify those who may be at higher risk for certain medical problems.  How is BMI used with adults?  BMI is used as a screening tool to identify possible weight problems. It is used to check whether a person is obese, overweight, healthy weight, or underweight.  How is BMI calculated?  BMI measures your weight and compares it to your height. This can be done either in English ([place] or metric measurements. Note that charts are available to help you find your BMI quickly and easily without having to do these calculations yourself.  To calculate your BMI in English ([place] measurements, your health care provider will:  1. Measure your weight in pounds (lb).  2. Multiply the number of pounds by 703.  ? For example, for a person who weighs 180 lb, multiply that number by 703, which equals 126,540.  3. Measure your height in inches (in). Then multiply that number by itself to get a measurement called \"inches squared.\"  ? For example, for a person who is 70 in tall, the \"inches squared\" measurement is 70 in x 70 in, which equals 4900 inches squared.  4. Divide the total from Step 2 (number of lb x 703) by the total from Step 3 (inches squared): 126,540 ÷ 4900 = 25.8. This is your BMI.  To calculate your BMI in metric measurements, your health care provider will:  1. Measure your weight in kilograms (kg).  2. Measure your height in meters (m). Then multiply that number by itself to get a measurement called \"meters squared.\"  ? For example, for a person who is 1.75 m tall, the \"meters squared\" measurement is 1.75 m x 1.75 m, which is equal to 3.1 meters squared.  3. Divide the number of kilograms (your weight) by the meters squared number. In this example: 70 ÷ 3.1 = 22.6. This is your " BMI.  How is BMI interpreted?  To interpret your results, your health care provider will use BMI charts to identify whether you are underweight, normal weight, overweight, or obese. The following guidelines will be used:  · Underweight: BMI less than 18.5.  · Normal weight: BMI between 18.5 and 24.9.  · Overweight: BMI between 25 and 29.9.  · Obese: BMI of 30 and above.  Please note:  · Weight includes both fat and muscle, so someone with a muscular build, such as an athlete, may have a BMI that is higher than 24.9. In cases like these, BMI is not an accurate measure of body fat.  · To determine if excess body fat is the cause of a BMI of 25 or higher, further assessments may need to be done by a health care provider.  · BMI is usually interpreted in the same way for men and women.  Why is BMI a useful tool?  BMI is useful in two ways:  · Identifying a weight problem that may be related to a medical condition, or that may increase the risk for medical problems.  · Promoting lifestyle and diet changes in order to reach a healthy weight.  Summary  · Body mass index (BMI) is a number that is calculated from a person's weight and height.  · BMI may help to estimate how much of a person's weight is composed of fat. BMI can help identify those who may be at higher risk for certain medical problems.  · BMI can be measured using English measurements or metric measurements.  · To interpret your results, your health care provider will use BMI charts to identify whether you are underweight, normal weight, overweight, or obese.  This information is not intended to replace advice given to you by your health care provider. Make sure you discuss any questions you have with your health care provider.  Document Released: 08/29/2005 Document Revised: 10/31/2018 Document Reviewed: 10/31/2018  [email or web address] Interactive Patient Education © 2020 [email or web address]

## 2020-06-05 RX ORDER — METOPROLOL SUCCINATE 25 MG/1
25 TABLET, EXTENDED RELEASE ORAL DAILY
Qty: 90 TABLET | Refills: 3 | Status: SHIPPED | OUTPATIENT
Start: 2020-06-05 | End: 2022-01-20 | Stop reason: SDUPTHER

## 2020-11-05 ENCOUNTER — OFFICE VISIT (OUTPATIENT)
Dept: CARDIOLOGY | Facility: CLINIC | Age: 65
End: 2020-11-05

## 2020-11-05 VITALS
SYSTOLIC BLOOD PRESSURE: 117 MMHG | HEART RATE: 74 BPM | HEIGHT: 63 IN | WEIGHT: 212.6 LBS | OXYGEN SATURATION: 95 % | DIASTOLIC BLOOD PRESSURE: 72 MMHG | BODY MASS INDEX: 37.67 KG/M2

## 2020-11-05 DIAGNOSIS — I25.10 CORONARY ARTERY DISEASE INVOLVING NATIVE CORONARY ARTERY OF NATIVE HEART WITHOUT ANGINA PECTORIS: Primary | ICD-10-CM

## 2020-11-05 DIAGNOSIS — I10 ESSENTIAL HYPERTENSION: ICD-10-CM

## 2020-11-05 DIAGNOSIS — E78.5 DYSLIPIDEMIA: ICD-10-CM

## 2020-11-05 PROCEDURE — 99213 OFFICE O/P EST LOW 20 MIN: CPT | Performed by: PHYSICIAN ASSISTANT

## 2020-11-05 RX ORDER — NITROGLYCERIN 0.4 MG/1
TABLET SUBLINGUAL
Qty: 25 TABLET | Refills: 2 | Status: SHIPPED | OUTPATIENT
Start: 2020-11-05 | End: 2021-02-08

## 2020-11-05 NOTE — PROGRESS NOTES
Problem list     Subjective   Kathrine Bella is a 65 y.o. female     Chief Complaint   Patient presents with   • Follow-up   • Coronary Artery Disease   • Shortness of Breath   Problem list  1.  Nonobstructive coronary artery disease  1.1 cardiac catheterization in the distant past [facility] which was nonobstructive per patient report, inadequate data  1.2 cardiac catheterization in [place] approximately 5 years ago and 2014 that was nonobstructive with moderate disease noted in 2 vessels, inadequate data  1.3 stress test September 2019 with no evidence of ischemia preserved LV function  2.  Preserved systolic function  3.  Hypertension  4.  Dyslipidemia  5.  Chronic tobacco use  6.  Diabetes mellitus    HPI    Patient is a 65-year-old female presents back to the office for routine follow-up.  She is done remarkably well.  She does not have any chest pain or pressure.  No shortness of breath.  No PND orthopnea.    She does not palpitate or have dysrhythmic symptoms.  She feels remarkably well.  She is monitored in the setting of coronary disease.  Most recent stress test in September 2019 demonstrates no evidence of ischemia.  Otherwise she is doing well      Current Outpatient Medications on File Prior to Visit   Medication Sig Dispense Refill   • aspirin 81 MG EC tablet Take 1 tablet by mouth Daily. 30 tablet 11   • atorvastatin (LIPITOR) 80 MG tablet Take 80 mg by mouth every night at bedtime.  0   • escitalopram (LEXAPRO) 20 MG tablet Take 20 mg by mouth Daily.     • esomeprazole (nexIUM) 40 MG capsule Daily.     • gabapentin (NEURONTIN) 800 MG tablet Take 800 mg by mouth 2 (Two) Times a Day.  0   • HYDROcodone-acetaminophen (NORCO) 5-325 MG per tablet Take 1 tablet by mouth Every 8 (Eight) Hours As Needed. for pain  0   • levothyroxine (SYNTHROID, LEVOTHROID) 125 MCG tablet Take 125 mcg by mouth Daily.  0   • lisinopril (PRINIVIL,ZESTRIL) 5 MG tablet Take 1 tablet by mouth Daily. (Patient taking  differently: Take 10 mg by mouth Daily.) 30 tablet 11   • METFORMIN HCL ER PO 1,000 mg 2 (Two) Times a Day.  0   • metoprolol succinate XL (TOPROL-XL) 25 MG 24 hr tablet Take 1 tablet by mouth Daily. 90 tablet 3   • PROAIR  (90 Base) MCG/ACT inhaler As Needed.  0   • [DISCONTINUED] nitroglycerin (NITROSTAT) 0.4 MG SL tablet 1 under the tongue as needed for angina, may repeat q5mins for up three doses 25 tablet 2   • meloxicam (MOBIC) 7.5 MG tablet Take 7.5 mg by mouth Daily.       No current facility-administered medications on file prior to visit.        Patient has no known allergies.    Past Medical History:   Diagnosis Date   • Asthma    • Coronary artery disease    • Diabetes mellitus (CMS/HCC)    • Hyperlipidemia    • Hypertension    • Myocardial infarction (CMS/HCC)    • Thyroid disease        Social History     Socioeconomic History   • Marital status: Legally      Spouse name: Not on file   • Number of children: Not on file   • Years of education: Not on file   • Highest education level: Not on file   Tobacco Use   • Smoking status: Current Every Day Smoker     Packs/day: 1.00     Years: 50.00     Pack years: 50.00     Types: Cigarettes   • Smokeless tobacco: Never Used   Substance and Sexual Activity   • Alcohol use: No   • Drug use: No   • Sexual activity: Defer       Family History   Problem Relation Age of Onset   • Heart disease Mother    • Heart attack Mother    • Hypertension Mother    • Heart disease Father    • Heart attack Father    • Hypertension Father    • Heart disease Brother        Review of Systems   Constitutional: Negative.  Negative for chills, fatigue and fever.   HENT: Negative.  Negative for congestion, rhinorrhea and sore throat.    Eyes: Positive for visual disturbance.   Respiratory: Positive for apnea (does not use her CPap). Negative for chest tightness and shortness of breath.    Cardiovascular: Positive for leg swelling (left one due to injury). Negative for  "chest pain and palpitations.   Endocrine: Negative.  Negative for cold intolerance and heat intolerance.   Musculoskeletal: Positive for arthralgias, back pain, neck pain and neck stiffness.   Skin: Negative.  Negative for rash and wound.   Allergic/Immunologic: Positive for environmental allergies (seasonal) and food allergies.   Neurological: Positive for light-headedness (sometimes when she doesn't wear her glasses). Negative for dizziness, weakness, numbness and headaches.   Hematological: Does not bruise/bleed easily.   Psychiatric/Behavioral: Negative for sleep disturbance.   All other systems reviewed and are negative.      Objective   Vitals:    11/05/20 1259   BP: 117/72   Pulse: 74   SpO2: 95%   Weight: 96.4 kg (212 lb 9.6 oz)   Height: 160 cm (62.99\")      /72   Pulse 74   Ht 160 cm (62.99\")   Wt 96.4 kg (212 lb 9.6 oz)   SpO2 95%   BMI 37.67 kg/m²     Lab Results (most recent)     None          Physical Exam  Vitals signs and nursing note reviewed.   Constitutional:       General: She is not in acute distress.     Appearance: Normal appearance. She is well-developed.   HENT:      Head: Normocephalic and atraumatic.   Eyes:      General: No scleral icterus.        Right eye: No discharge.         Left eye: No discharge.      Conjunctiva/sclera: Conjunctivae normal.   Neck:      Vascular: No carotid bruit.   Cardiovascular:      Rate and Rhythm: Normal rate and regular rhythm.      Heart sounds: Normal heart sounds. No murmur. No friction rub. No gallop.    Pulmonary:      Effort: Pulmonary effort is normal. No respiratory distress.      Breath sounds: Normal breath sounds. No wheezing or rales.   Chest:      Chest wall: No tenderness.   Musculoskeletal:      Right lower leg: No edema.      Left lower leg: No edema.   Skin:     General: Skin is warm and dry.      Coloration: Skin is not pale.      Findings: No erythema or rash.   Neurological:      Mental Status: She is alert and oriented to " person, place, and time.      Cranial Nerves: No cranial nerve deficit.   Psychiatric:         Behavior: Behavior normal.         Procedure   Procedures       Assessment/Plan     Problems Addressed this Visit        Cardiovascular and Mediastinum    Essential hypertension    Coronary artery disease involving native coronary artery of native heart without angina pectoris - Primary    Relevant Medications    nitroglycerin (NITROSTAT) 0.4 MG SL tablet       Other    Dyslipidemia      Diagnoses       Codes Comments    Coronary artery disease involving native coronary artery of native heart without angina pectoris    -  Primary ICD-10-CM: I25.10  ICD-9-CM: 414.01     Essential hypertension     ICD-10-CM: I10  ICD-9-CM: 401.9     Dyslipidemia     ICD-10-CM: E78.5  ICD-9-CM: 272.4         Recommendation  1.  Patient with coronary artery disease doing remarkably well without ischemic symptoms and most recent stress test demonstrates no evidence of ischemia.  For now we will continue risk factor modification    2.  Patient with dyslipidemia on high-dose statin.  Labs are being monitored by primary    3.  Patient with hypertension but doing well on medical regimen with blood pressure stable.  For now we will see her back for follow-up in 6 months or sooner as symptoms discussed.  Follow-up with primary as scheduled             Kathrine Bella  reports that she has been smoking cigarettes. She has a 50.00 pack-year smoking history. She has never used smokeless tobacco.. I have educated her on the risk of diseases from using tobacco products such as cancer, COPD and heart disease.             Patient's Body mass index is 37.67 kg/m². BMI is above normal parameters. Recommendations include: educational material.       Electronically signed by:

## 2020-11-05 NOTE — PATIENT INSTRUCTIONS
How to Quarantine at Home  Information for Patients and Families    These instructions are for people with confirmed or suspected COVID-19 who do not need to be hospitalized and those with confirmed COVID-19 who were hospitalized and discharged to care for themselves at home.    If you were tested through the Health Department  The Health Department will monitor your wellbeing.  If it is determined that you do not need to be hospitalized and can be isolated at home, you will be monitored by staff from your local or state health department.     If you were tested through a Commercial [facility]  You will need to monitor yourself and report changes in your symptoms to your doctor.  See the section below called Monitor Your Symptoms.    Follow these steps until a healthcare provider or local or state health department says you can return to your normal activities.    Stay home except to get medical care  • Restrict activities outside your home, except for getting medical care.   • Do not go to work, school, or public areas.   • Avoid using public transportation, ride-sharing, or taxis.    Separate yourself from other people and animals in your home  People  As much as possible, you should stay in a specific room and away from other people in your home. Also, you should use a separate bathroom, if available.    Animals  You should restrict contact with pets and other animals while you are sick with COVID-19, just like you would around other people. When possible, have another member of your household care for your animals while you are sick. If you are sick with COVID-19, avoid contact with your pet, including petting, snuggling, being kissed or licked, and sharing food. If you must care for your pet or be around animals while you are sick, wash your hands before and after you interact with pets and wear a facemask. See COVID-19 and Animals for more information.    Call ahead before visiting your doctor  If you have a medical  appointment, call the healthcare provider and tell them that you have or may have COVID-19. This information will help the healthcare provider’s office take steps to keep other people from getting infected or exposed.    Wear a facemask  You should wear a facemask when you are around other people (e.g., sharing a room or vehicle) or pets and before you enter a healthcare provider’s office.     If you are not able to wear a facemask (for example, because it causes trouble breathing), then people who live with you should not stay in the same room with you, or they should wear a facemask if they enter your room.    Cover your coughs and sneezes  • Cover your mouth and nose with a tissue when you cough or sneeze.   • Throw used tissues in a lined trash can.   • Immediately wash your hands with soap and water for at least 20 seconds or, if soap and water are not available, clean your hands with an alcohol-based hand  that contains at least 60% alcohol.    Clean your hands often  • Wash your hands often with soap and water for at least 20 seconds, especially after blowing your nose, coughing, or sneezing; going to the bathroom; and before eating or preparing food.     • If soap and water are not readily available, use an alcohol-based hand  with at least 60% alcohol, covering all surfaces of your hands and rubbing them together until they feel dry.    • Soap and water are the best option if hands are visibly dirty. Avoid touching your eyes, nose, and mouth with unwashed hands.    Avoid sharing personal household items  • You should not share dishes, drinking glasses, cups, eating utensils, towels, or bedding with other people or pets in your home.   • After using these items, they should be washed thoroughly with soap and water.    Clean all “high-touch” surfaces everyday  • High touch surfaces include counters, tabletops, doorknobs, bathroom fixtures, toilets, phones, keyboards, tablets, and bedside  tables.   • Also, clean any surfaces that may have blood, stool, or body fluids on them.   • Use a household cleaning spray or wipe, according to the label instructions. Labels contain instructions for safe and effective use of the cleaning product, including precautions you should take when applying the product, such as wearing gloves and making sure you have good ventilation during use of the product.    Monitor your symptoms  • Seek prompt medical attention if your illness is worsening (e.g., difficulty breathing).   • Before seeking care, call your healthcare provider and tell them that you have, or are being evaluated for, COVID-19.   • Put on a facemask before you enter the facility.     • These steps will help the healthcare provider’s office to keep other people in the office or waiting room from getting infected or exposed.   • Persons who are placed under active monitoring or facilitated self-monitoring should follow instructions provided by their local health department or occupational health professionals, as appropriate.  • If you have a medical emergency and need to call 911, notify the dispatch personnel that you have, or are being evaluated for COVID-19. If possible, put on a facemask before emergency medical services arrive.    Discontinuing home isolation  Patients with confirmed COVID-19 should remain under home isolation precautions until the risk of secondary transmission to others is thought to be low. The decision to discontinue home isolation precautions should be made on a case-by-case basis, in consultation with healthcare providers and state and local health departments.    The below content are for household members, intimate partners, and caregivers of a patient with symptomatic laboratory-confirmed COVID-19 or a patient under investigation:    Household members, intimate partners, and caregivers may have close contact with a person with symptomatic, laboratory-confirmed COVID-19 or a  person under investigation.     Close contacts should monitor their health; they should call their healthcare provider right away if they develop symptoms suggestive of COVID-19 (e.g., fever, cough, shortness of breath)     Close contacts should also follow these recommendations:  • Make sure that you understand and can help the patient follow their healthcare provider’s instructions for medication(s) and care. You should help the patient with basic needs in the home and provide support for getting groceries, prescriptions, and other personal needs.  • Monitor the patient’s symptoms. If the patient is getting sicker, call his or her healthcare provider and tell them that the patient has laboratory-confirmed COVID-19. This will help the healthcare provider’s office take steps to keep other people in the office or waiting room from getting infected. Ask the healthcare provider to call the local or [facility] health department for additional guidance. If the patient has a medical emergency and you need to call 911, notify the dispatch personnel that the patient has, or is being evaluated for COVID-19.  • Household members should stay in another room or be  from the patient as much as possible. Household members should use a separate bedroom and bathroom, if available.  • Prohibit visitors who do not have an essential need to be in the home.  • Household members should care for any pets in the home. Do not handle pets or other animals while sick.  For more information, see COVID-19 and Animals.  • Make sure that shared spaces in the home have good air flow, such as by an air conditioner or an opened window, weather permitting.  • Perform hand hygiene frequently. Wash your hands often with soap and water for at least 20 seconds or use an alcohol-based hand  that contains 60 to 95% alcohol, covering all surfaces of your hands and rubbing them together until they feel dry. Soap and water should be used  preferentially if hands are visibly dirty.  • Avoid touching your eyes, nose, and mouth with unwashed hands.  • The patient should wear a facemask when you are around other people. If the patient is not able to wear a facemask (for example, because it causes trouble breathing), you, as the caregiver, should wear a mask when you are in the same room as the patient.  • Wear a disposable facemask and gloves when you touch or have contact with the patient’s blood, stool, or body fluids, such as saliva, sputum, nasal mucus, vomit, or urine.   o Throw out disposable facemasks and gloves after using them. Do not reuse.  o When removing personal protective equipment, first remove and dispose of gloves. Then, immediately clean your hands with soap and water or alcohol-based hand . Next, remove and dispose of facemask, and immediately clean your hands again with soap and water or alcohol-based hand .  • Avoid sharing household items with the patient. You should not share dishes, drinking glasses, cups, eating utensils, towels, bedding, or other items. After the patient uses these items, you should wash them thoroughly (see below “Wash laundry thoroughly”).  • Clean all “high-touch” surfaces, such as counters, tabletops, doorknobs, bathroom fixtures, toilets, phones, keyboards, tablets, and bedside tables, every day. Also, clean any surfaces that may have blood, stool, or body fluids on them.   o Use a household cleaning spray or wipe, according to the label instructions. Labels contain instructions for safe and effective use of the cleaning product including precautions you should take when applying the product, such as wearing gloves and making sure you have good ventilation during use of the product.  • Wash laundry thoroughly.   o Immediately remove and wash clothes or bedding that have blood, stool, or body fluids on them.  o Wear disposable gloves while handling soiled items and keep soiled items away  from your body. Clean your hands (with soap and water or an alcohol-based hand ) immediately after removing your gloves.  o Read and follow directions on labels of laundry or clothing items and detergent. In general, using a normal laundry detergent according to washing machine instructions and dry thoroughly using the warmest temperatures recommended on the clothing label.  • Place all used disposable gloves, facemasks, and other contaminated items in a lined container before disposing of them with other household waste. Clean your hands (with soap and water or an alcohol-based hand ) immediately after handling these items. Soap and water should be used preferentially if hands are visibly dirty.  • Discuss any additional questions with your state or local health department or healthcare provider.    Adapted from information provided by the Centers for Disease Control and Prevention.  For more information, visit https://www.cdc.gov/coronavirus/2019-ncov/hcp/guidance-prevent-spread.htmlFat and Cholesterol Restricted Eating Plan  Getting too much fat and cholesterol in your diet may cause health problems. Choosing the right foods helps keep your fat and cholesterol at normal levels. This can keep you from getting certain diseases.  Your doctor may recommend an eating plan that includes:  · Total fat: ______% or less of total calories a day.  · Saturated fat: ______% or less of total calories a day.  · Cholesterol: less than _________mg a day.  · Fiber: ______g a day.  What are tips for following this plan?  Meal planning  · At meals, divide your plate into four equal parts:  ? Fill one-half of your plate with vegetables and green salads.  ? Fill one-fourth of your plate with whole grains.  ? Fill one-fourth of your plate with low-fat (lean) protein foods.  · Eat fish that is high in omega-3 fats at least two times a week. This includes mackerel, tuna, sardines, and salmon.  · Eat foods that are high  "in fiber, such as whole grains, beans, apples, broccoli, carrots, peas, and barley.  General tips    · Work with your doctor to lose weight if you need to.  · Avoid:  ? Foods with added sugar.  ? Fried foods.  ? Foods with partially hydrogenated oils.  · Limit alcohol intake to no more than 1 drink a day for nonpregnant women and 2 drinks a day for men. One drink equals 12 oz of beer, 5 oz of wine, or 1½ oz of hard liquor.  Reading food labels  · Check food labels for:  ? Trans fats.  ? Partially hydrogenated oils.  ? Saturated fat (g) in each serving.  ? Cholesterol (mg) in each serving.  ? Fiber (g) in each serving.  · Choose foods with healthy fats, such as:  ? Monounsaturated fats.  ? Polyunsaturated fats.  ? Omega-3 fats.  · Choose grain products that have whole grains. Look for the word \"whole\" as the first word in the ingredient list.  Cooking  · Cook foods using low-fat methods. These include baking, boiling, grilling, and broiling.  · Eat more home-cooked foods. Eat at restaurants and buffets less often.  · Avoid cooking using saturated fats, such as butter, cream, palm oil, palm kernel oil, and coconut oil.  Recommended foods    Fruits  · All fresh, canned (in natural juice), or frozen fruits.  Vegetables  · Fresh or frozen vegetables (raw, steamed, roasted, or grilled). Green salads.  Grains  · Whole grains, such as whole wheat or whole grain breads, crackers, cereals, and pasta. Unsweetened oatmeal, bulgur, barley, quinoa, or brown rice. Corn or whole wheat flour tortillas.  Meats and other protein foods  · Ground beef (85% or leaner), grass-fed beef, or beef trimmed of fat. Skinless chicken or turkey. Ground chicken or turkey. Pork trimmed of fat. All fish and seafood. Egg whites. Dried beans, peas, or lentils. Unsalted nuts or seeds. Unsalted canned beans. Nut butters without added sugar or oil.  Dairy  · Low-fat or nonfat dairy products, such as skim or 1% milk, 2% or reduced-fat cheeses, low-fat and " fat-free ricotta or cottage cheese, or plain low-fat and nonfat yogurt.  Fats and oils  · Tub margarine without trans fats. Light or reduced-fat mayonnaise and salad dressings. Avocado. Olive, canola, sesame, or safflower oils.  The items listed above may not be a complete list of foods and beverages you can eat. Contact a dietitian for more information.  Foods to avoid  Fruits  · Canned fruit in heavy syrup. Fruit in cream or butter sauce. Fried fruit.  Vegetables  · Vegetables cooked in cheese, cream, or butter sauce. Fried vegetables.  Grains  · White bread. White pasta. White rice. Cornbread. Bagels, pastries, and croissants. Crackers and snack foods that contain trans fat and hydrogenated oils.  Meats and other protein foods  · Fatty cuts of meat. Ribs, chicken wings, bullock, sausage, bologna, salami, chitterlings, fatback, hot dogs, bratwurst, and packaged lunch meats. Liver and organ meats. Whole eggs and egg yolks. Chicken and turkey with skin. Fried meat.  Dairy  · Whole or 2% milk, cream, half-and-half, and cream cheese. Whole milk cheeses. Whole-fat or sweetened yogurt. Full-fat cheeses. Nondairy creamers and whipped toppings. Processed cheese, cheese spreads, and cheese curds.  Beverages  · Alcohol. Sugar-sweetened drinks such as sodas, lemonade, and fruit drinks.  Fats and oils  · Butter, stick margarine, lard, shortening, ghee, or bullock fat. Coconut, palm kernel, and palm oils.  Sweets and desserts  · Corn syrup, sugars, honey, and molasses. Candy. Jam and jelly. Syrup. Sweetened cereals. Cookies, pies, cakes, donuts, muffins, and ice cream.  The items listed above may not be a complete list of foods and beverages you should avoid. Contact a dietitian for more information.  Summary  · Choosing the right foods helps keep your fat and cholesterol at normal levels. This can keep you from getting certain diseases.  · At meals, fill one-half of your plate with vegetables and green salads.  · Eat high-fiber  foods, like whole grains, beans, apples, carrots, peas, and barley.  · Limit added sugar, saturated fats, alcohol, and fried foods.  This information is not intended to replace advice given to you by your health care provider. Make sure you discuss any questions you have with your health care provider.  Document Released: 06/18/2013 Document Revised: 08/21/2019 Document Reviewed: 09/04/2018  [employer] Patient Education © 2020 [employer]  BMI for Adults  What is BMI?  Body mass index (BMI) is a number that is calculated from a person's weight and height. BMI can help estimate how much of a person's weight is composed of fat. BMI does not measure body fat directly. Rather, it is an alternative to procedures that directly measure body fat, which can be difficult and expensive.  BMI can help identify people who may be at higher risk for certain medical problems.  What are BMI measurements used for?  BMI is used as a screening tool to identify possible weight problems. It helps determine whether a person is obese, overweight, a healthy weight, or underweight.  BMI is useful for:  · Identifying a weight problem that may be related to a medical condition or may increase the risk for medical problems.  · Promoting changes, such as changes in diet and exercise, to help reach a healthy weight. BMI screening can be repeated to see if these changes are working.  How is BMI calculated?  BMI involves measuring your weight in relation to your height. Both height and weight are measured, and the BMI is calculated from those numbers. This can be done either in English ([place] or metric measurements. Note that charts and online BMI calculators are available to help you find your BMI quickly and easily without having to do these calculations yourself.  To calculate your BMI in English ([place] measurements:    1. Measure your weight in pounds (lb).  2. Multiply the number of pounds by 703.  ? For example, for a person who weighs 180  "lb, multiply that number by 703, which equals 126,540.  3. Measure your height in inches. Then multiply that number by itself to get a measurement called \"inches squared.\"  ? For example, for a person who is 70 inches tall, the \"inches squared\" measurement is 70 inches x 70 inches, which equals 4,900 inches squared.  4. Divide the total from step 2 (number of lb x 703) by the total from step 3 (inches squared): 126,540 ÷ 4,900 = 25.8. This is your BMI.  To calculate your BMI in metric measurements:  1. Measure your weight in kilograms (kg).  2. Measure your height in meters (m). Then multiply that number by itself to get a measurement called \"meters squared.\"  ? For example, for a person who is 1.75 m tall, the \"meters squared\" measurement is 1.75 m x 1.75 m, which is equal to 3.1 meters squared.  3. Divide the number of kilograms (your weight) by the meters squared number. In this example: 70 ÷ 3.1 = 22.6. This is your BMI.  What do the results mean?  BMI charts are used to identify whether you are underweight, normal weight, overweight, or obese. The following guidelines will be used:  · Underweight: BMI less than 18.5.  · Normal weight: BMI between 18.5 and 24.9.  · Overweight: BMI between 25 and 29.9.  · Obese: BMI of 30 or above.  Keep these notes in mind:  · Weight includes both fat and muscle, so someone with a muscular build, such as an athlete, may have a BMI that is higher than 24.9. In cases like these, BMI is not an accurate measure of body fat.  · To determine if excess body fat is the cause of a BMI of 25 or higher, further assessments may need to be done by a health care provider.  · BMI is usually interpreted in the same way for men and women.  Where to find more information  For more information about BMI, including tools to quickly calculate your BMI, go to these websites:  · Centers for Disease Control and Prevention: www.cdc.gov  · American Heart Association: www.heart.org  · National Heart, " Lung, and Blood [place]: www.nhlbi.nih.gov  Summary  · Body mass index (BMI) is a number that is calculated from a person's weight and height.  · BMI may help estimate how much of a person's weight is composed of fat. BMI can help identify those who may be at higher risk for certain medical problems.  · BMI can be measured using English measurements or metric measurements.  · BMI charts are used to identify whether you are underweight, normal weight, overweight, or obese.  This information is not intended to replace advice given to you by your health care provider. Make sure you discuss any questions you have with your health care provider.  Document Released: 08/29/2005 Document Revised: 09/09/2020 Document Reviewed: 07/17/2020  [employer] Patient Education © 2020 [employer]    Steps to Quit Smoking  Smoking tobacco is the leading cause of preventable death. It can affect almost every organ in the body. Smoking puts you and people around you at risk for many serious, long-lasting (chronic) diseases. Quitting smoking can be hard, but it is one of the best things that you can do for your health. It is never too late to quit.  How do I get ready to quit?  When you decide to quit smoking, make a plan to help you succeed. Before you quit:  · Pick a date to quit. Set a date within the next 2 weeks to give you time to prepare.  · Write down the reasons why you are quitting. Keep this list in places where you will see it often.  · Tell your family, friends, and co-workers that you are quitting. Their support is important.  · Talk with your doctor about the choices that may help you quit.  · Find out if your health insurance will pay for these treatments.  · Know the people, places, things, and activities that make you want to smoke (triggers). Avoid them.  What first steps can I take to quit smoking?  · Throw away all cigarettes at home, at work, and in your car.  · Throw away the things that you use when you smoke,  such as ashtrays and lighters.  · Clean your car. Make sure to empty the ashtray.  · Clean your home, including curtains and carpets.  What can I do to help me quit smoking?  Talk with your doctor about taking medicines and seeing a counselor at the same time. You are more likely to succeed when you do both.  · If you are pregnant or breastfeeding, talk with your doctor about counseling or other ways to quit smoking. Do not take medicine to help you quit smoking unless your doctor tells you to do so.  To quit smoking:  Quit right away  · Quit smoking totally, instead of slowly cutting back on how much you smoke over a period of time.  · Go to counseling. You are more likely to quit if you go to counseling sessions regularly.  Take medicine  You may take medicines to help you quit. Some medicines need a prescription, and some you can buy over-the-counter. Some medicines may contain a drug called nicotine to replace the nicotine in cigarettes. Medicines may:  · Help you to stop having the desire to smoke (cravings).  · Help to stop the problems that come when you stop smoking (withdrawal symptoms).  Your doctor may ask you to use:  · Nicotine patches, gum, or lozenges.  · Nicotine inhalers or sprays.  · Non-nicotine medicine that is taken by mouth.  Find resources  Find resources and other ways to help you quit smoking and remain smoke-free after you quit. These resources are most helpful when you use them often. They include:  · Online chats with a counselor.  · Phone quitlines.  · Printed self-help materials.  · Support groups or group counseling.  · Text messaging programs.  · Mobile phone apps. Use apps on your mobile phone or tablet that can help you stick to your quit plan. There are many free apps for mobile phones and tablets as well as websites. Examples include Quit Guide from the CDC and smokefree.gov    What things can I do to make it easier to quit?    · Talk to your family and friends. Ask them to  support and encourage you.  · Call a phone quitline (2-147-QUITNOW), reach out to support groups, or work with a counselor.  · Ask people who smoke to not smoke around you.  · Avoid places that make you want to smoke, such as:  ? Bars.  ? Parties.  ? Smoke-break areas at work.  · Spend time with people who do not smoke.  · Lower the stress in your life. Stress can make you want to smoke. Try these things to help your stress:  ? Getting regular exercise.  ? Doing deep-breathing exercises.  ? Doing yoga.  ? Meditating.  ? Doing a body scan. To do this, close your eyes, focus on one area of your body at a time from head to toe. Notice which parts of your body are tense. Try to relax the muscles in those areas.  How will I feel when I quit smoking?  Day 1 to 3 weeks  Within the first 24 hours, you may start to have some problems that come from quitting tobacco. These problems are very bad 2-3 days after you quit, but they do not often last for more than 2-3 weeks. You may get these symptoms:  · Mood swings.  · Feeling restless, nervous, angry, or annoyed.  · Trouble concentrating.  · Dizziness.  · Strong desire for high-sugar foods and nicotine.  · Weight gain.  · Trouble pooping (constipation).  · Feeling like you may vomit (nausea).  · Coughing or a sore throat.  · Changes in how the medicines that you take for other issues work in your body.  · Depression.  · Trouble sleeping (insomnia).  Week 3 and afterward  After the first 2-3 weeks of quitting, you may start to notice more positive results, such as:  · Better sense of smell and taste.  · Less coughing and sore throat.  · Slower heart rate.  · Lower blood pressure.  · Clearer skin.  · Better breathing.  · Fewer sick days.  Quitting smoking can be hard. Do not give up if you fail the first time. Some people need to try a few times before they succeed. Do your best to stick to your quit plan, and talk with your doctor if you have any questions or  concerns.  Summary  · Smoking tobacco is the leading cause of preventable death. Quitting smoking can be hard, but it is one of the best things that you can do for your health.  · When you decide to quit smoking, make a plan to help you succeed.  · Quit smoking right away, not slowly over a period of time.  · When you start quitting, seek help from your doctor, family, or friends.  This information is not intended to replace advice given to you by your health care provider. Make sure you discuss any questions you have with your health care provider.  Document Released: 10/14/2010 Document Revised: 09/11/2020 Document Reviewed: 03/07/2020  Elsevier Patient Education © 2020 [employer]

## 2021-01-26 ENCOUNTER — OFFICE VISIT (OUTPATIENT)
Dept: CARDIOLOGY | Facility: CLINIC | Age: 66
End: 2021-01-26

## 2021-01-26 VITALS
HEART RATE: 65 BPM | SYSTOLIC BLOOD PRESSURE: 110 MMHG | WEIGHT: 207.8 LBS | OXYGEN SATURATION: 98 % | DIASTOLIC BLOOD PRESSURE: 67 MMHG | HEIGHT: 63 IN | BODY MASS INDEX: 36.82 KG/M2

## 2021-01-26 DIAGNOSIS — I10 ESSENTIAL HYPERTENSION: ICD-10-CM

## 2021-01-26 DIAGNOSIS — R06.02 SHORTNESS OF BREATH: ICD-10-CM

## 2021-01-26 DIAGNOSIS — I25.10 CORONARY ARTERY DISEASE INVOLVING NATIVE CORONARY ARTERY OF NATIVE HEART WITHOUT ANGINA PECTORIS: Primary | ICD-10-CM

## 2021-01-26 PROCEDURE — 93000 ELECTROCARDIOGRAM COMPLETE: CPT | Performed by: PHYSICIAN ASSISTANT

## 2021-01-26 PROCEDURE — 99214 OFFICE O/P EST MOD 30 MIN: CPT | Performed by: PHYSICIAN ASSISTANT

## 2021-01-26 NOTE — PATIENT INSTRUCTIONS
Steps to Quit Smoking  Smoking tobacco is the leading cause of preventable death. It can affect almost every organ in the body. Smoking puts you and people around you at risk for many serious, long-lasting (chronic) diseases. Quitting smoking can be hard, but it is one of the best things that you can do for your health. It is never too late to quit.  How do I get ready to quit?  When you decide to quit smoking, make a plan to help you succeed. Before you quit:  · Pick a date to quit. Set a date within the next 2 weeks to give you time to prepare.  · Write down the reasons why you are quitting. Keep this list in places where you will see it often.  · Tell your family, friends, and co-workers that you are quitting. Their support is important.  · Talk with your doctor about the choices that may help you quit.  · Find out if your health insurance will pay for these treatments.  · Know the people, places, things, and activities that make you want to smoke (triggers). Avoid them.  What first steps can I take to quit smoking?  · Throw away all cigarettes at home, at work, and in your car.  · Throw away the things that you use when you smoke, such as ashtrays and lighters.  · Clean your car. Make sure to empty the ashtray.  · Clean your home, including curtains and carpets.  What can I do to help me quit smoking?  Talk with your doctor about taking medicines and seeing a counselor at the same time. You are more likely to succeed when you do both.  · If you are pregnant or breastfeeding, talk with your doctor about counseling or other ways to quit smoking. Do not take medicine to help you quit smoking unless your doctor tells you to do so.  To quit smoking:  Quit right away  · Quit smoking totally, instead of slowly cutting back on how much you smoke over a period of time.  · Go to counseling. You are more likely to quit if you go to counseling sessions regularly.  Take medicine  You may take medicines to help you quit. Some  medicines need a prescription, and some you can buy over-the-counter. Some medicines may contain a drug called nicotine to replace the nicotine in cigarettes. Medicines may:  · Help you to stop having the desire to smoke (cravings).  · Help to stop the problems that come when you stop smoking (withdrawal symptoms).  Your doctor may ask you to use:  · Nicotine patches, gum, or lozenges.  · Nicotine inhalers or sprays.  · Non-nicotine medicine that is taken by mouth.  Find resources  Find resources and other ways to help you quit smoking and remain smoke-free after you quit. These resources are most helpful when you use them often. They include:  · Online chats with a counselor.  · Phone quitlines.  · Printed self-help materials.  · Support groups or group counseling.  · Text messaging programs.  · Mobile phone apps. Use apps on your mobile phone or tablet that can help you stick to your quit plan. There are many free apps for mobile phones and tablets as well as websites. Examples include Quit Guide from the CDC and smokefree.gov    What things can I do to make it easier to quit?    · Talk to your family and friends. Ask them to support and encourage you.  · Call a phone quitline (2-996-QUITNOW), reach out to support groups, or work with a counselor.  · Ask people who smoke to not smoke around you.  · Avoid places that make you want to smoke, such as:  ? Bars.  ? Parties.  ? Smoke-break areas at work.  · Spend time with people who do not smoke.  · Lower the stress in your life. Stress can make you want to smoke. Try these things to help your stress:  ? Getting regular exercise.  ? Doing deep-breathing exercises.  ? Doing yoga.  ? Meditating.  ? Doing a body scan. To do this, close your eyes, focus on one area of your body at a time from head to toe. Notice which parts of your body are tense. Try to relax the muscles in those areas.  How will I feel when I quit smoking?  Day 1 to 3 weeks  Within the first 24 hours,  you may start to have some problems that come from quitting tobacco. These problems are very bad 2-3 days after you quit, but they do not often last for more than 2-3 weeks. You may get these symptoms:  · Mood swings.  · Feeling restless, nervous, angry, or annoyed.  · Trouble concentrating.  · Dizziness.  · Strong desire for high-sugar foods and nicotine.  · Weight gain.  · Trouble pooping (constipation).  · Feeling like you may vomit (nausea).  · Coughing or a sore throat.  · Changes in how the medicines that you take for other issues work in your body.  · Depression.  · Trouble sleeping (insomnia).  Week 3 and afterward  After the first 2-3 weeks of quitting, you may start to notice more positive results, such as:  · Better sense of smell and taste.  · Less coughing and sore throat.  · Slower heart rate.  · Lower blood pressure.  · Clearer skin.  · Better breathing.  · Fewer sick days.  Quitting smoking can be hard. Do not give up if you fail the first time. Some people need to try a few times before they succeed. Do your best to stick to your quit plan, and talk with your doctor if you have any questions or concerns.  Summary  · Smoking tobacco is the leading cause of preventable death. Quitting smoking can be hard, but it is one of the best things that you can do for your health.  · When you decide to quit smoking, make a plan to help you succeed.  · Quit smoking right away, not slowly over a period of time.  · When you start quitting, seek help from your doctor, family, or friends.  This information is not intended to replace advice given to you by your health care provider. Make sure you discuss any questions you have with your health care provider.  Document Revised: 09/11/2020 Document Reviewed: 03/07/2020  [employer] Patient Education © 2020 [employer].  Heart-Healthy Eating Plan  Heart-healthy meal planning includes:  · Eating less unhealthy fats.  · Eating more healthy fats.  · Making other changes in  your diet.  Talk with your doctor or a diet specialist (dietitian) to create an eating plan that is right for you.  What is my plan?  Your doctor may recommend an eating plan that includes:  · Total fat: ______% or less of total calories a day.  · Saturated fat: ______% or less of total calories a day.  · Cholesterol: less than _________mg a day.  What are tips for following this plan?  Cooking  Avoid frying your food. Try to bake, boil, grill, or broil it instead. You can also reduce fat by:  · Removing the skin from poultry.  · Removing all visible fats from meats.  · Steaming vegetables in water or broth.  Meal planning    · At meals, divide your plate into four equal parts:  ? Fill one-half of your plate with vegetables and green salads.  ? Fill one-fourth of your plate with whole grains.  ? Fill one-fourth of your plate with lean protein foods.  · Eat 4-5 servings of vegetables per day. A serving of vegetables is:  ? 1 cup of raw or cooked vegetables.  ? 2 cups of raw leafy greens.  · Eat 4-5 servings of fruit per day. A serving of fruit is:  ? 1 medium whole fruit.  ? ¼ cup of dried fruit.  ? ½ cup of fresh, frozen, or canned fruit.  ? ½ cup of 100% fruit juice.  · Eat more foods that have soluble fiber. These are apples, broccoli, carrots, beans, peas, and barley. Try to get 20-30 g of fiber per day.  · Eat 4-5 servings of nuts, legumes, and seeds per week:  ? 1 serving of dried beans or legumes equals ½ cup after being cooked.  ? 1 serving of nuts is ¼ cup.  ? 1 serving of seeds equals 1 tablespoon.  General information  · Eat more home-cooked food. Eat less restaurant, buffet, and fast food.  · Limit or avoid alcohol.  · Limit foods that are high in starch and sugar.  · Avoid fried foods.  · Lose weight if you are overweight.  · Keep track of how much salt (sodium) you eat. This is important if you have high blood pressure. Ask your doctor to tell you more about this.  · Try to add vegetarian meals each  week.  Fats  · Choose healthy fats. These include olive oil and canola oil, flaxseeds, walnuts, almonds, and seeds.  · Eat more omega-3 fats. These include salmon, mackerel, sardines, tuna, flaxseed oil, and ground flaxseeds. Try to eat fish at least 2 times each week.  · Check food labels. Avoid foods with trans fats or high amounts of saturated fat.  · Limit saturated fats.  ? These are often found in animal products, such as meats, butter, and cream.  ? These are also found in plant foods, such as palm oil, palm kernel oil, and coconut oil.  · Avoid foods with partially hydrogenated oils in them. These have trans fats. Examples are stick margarine, some tub margarines, cookies, crackers, and other baked goods.  What foods can I eat?  Fruits  All fresh, canned (in natural juice), or frozen fruits.  Vegetables  Fresh or frozen vegetables (raw, steamed, roasted, or grilled). Green salads.  Grains  Most grains. Choose whole wheat and whole grains most of the time. Rice and pasta, including brown rice and pastas made with whole wheat.  Meats and other proteins  Lean, well-trimmed beef, veal, pork, and lamb. Chicken and turkey without skin. All fish and shellfish. Wild duck, rabbit, pheasant, and venison. Egg whites or low-cholesterol egg substitutes. Dried beans, peas, lentils, and tofu. Seeds and most nuts.  Dairy  Low-fat or nonfat cheeses, including ricotta and mozzarella. Skim or 1% milk that is liquid, powdered, or evaporated. Buttermilk that is made with low-fat milk. Nonfat or low-fat yogurt.  Fats and oils  Non-hydrogenated (trans-free) margarines. Vegetable oils, including soybean, sesame, sunflower, olive, peanut, safflower, corn, canola, and cottonseed. Salad dressings or mayonnaise made with a vegetable oil.  Beverages  Mineral water. Coffee and tea. Diet carbonated beverages.  Sweets and desserts  Sherbet, gelatin, and fruit ice. Small amounts of dark chocolate.  Limit all sweets and desserts.  Seasonings  and condiments  All seasonings and condiments.  The items listed above may not be a complete list of foods and drinks you can eat. Contact a dietitian for more options.  What foods should I avoid?  Fruits  Canned fruit in heavy syrup. Fruit in cream or butter sauce. Fried fruit. Limit coconut.  Vegetables  Vegetables cooked in cheese, cream, or butter sauce. Fried vegetables.  Grains  Breads that are made with saturated or trans fats, oils, or whole milk. Croissants. Sweet rolls. Donuts. High-fat crackers, such as cheese crackers.  Meats and other proteins  Fatty meats, such as hot dogs, ribs, sausage, bullock, rib-eye roast or steak. High-fat deli meats, such as salami and bologna. Caviar. Domestic duck and goose. Organ meats, such as liver.  Dairy  Cream, sour cream, cream cheese, and creamed cottage cheese. Whole-milk cheeses. Whole or 2% milk that is liquid, evaporated, or condensed. Whole buttermilk. Cream sauce or high-fat cheese sauce. Yogurt that is made from whole milk.  Fats and oils  Meat fat, or shortening. Cocoa butter, hydrogenated oils, palm oil, coconut oil, palm kernel oil. Solid fats and shortenings, including bullock fat, salt pork, lard, and butter. Nondairy cream substitutes. Salad dressings with cheese or sour cream.  Beverages  Regular sodas and juice drinks with added sugar.  Sweets and desserts  Frosting. Pudding. Cookies. Cakes. Pies. Milk chocolate or white chocolate. Buttered syrups. Full-fat ice cream or ice cream drinks.  The items listed above may not be a complete list of foods and drinks to avoid. Contact a dietitian for more information.  Summary  · Heart-healthy meal planning includes eating less unhealthy fats, eating more healthy fats, and making other changes in your diet.  · Eat a balanced diet. This includes fruits and vegetables, low-fat or nonfat dairy, lean protein, nuts and legumes, whole grains, and heart-healthy oils and fats.  This information is not intended to replace  advice given to you by your health care provider. Make sure you discuss any questions you have with your health care provider.  Document Revised: 02/21/2019 Document Reviewed: 01/25/2019  Elsevier Patient Education © 2020 [employer] Inc.

## 2021-01-26 NOTE — PROGRESS NOTES
"Problem list     Subjective   Kathrine Bella is a 65 y.o. female     Chief Complaint   Patient presents with   • Follow-up     cardiac clearance   • Coronary Artery Disease   Problem list  1.  Nonobstructive coronary artery disease  1.1 cardiac catheterization in the distant past [facility] which was nonobstructive per patient report, inadequate data  1.2 cardiac catheterization in [place] approximately 5 years ago and 2014 that was nonobstructive with moderate disease noted in 2 vessels, inadequate data  1.3 stress test September 2019 with no evidence of ischemia preserved LV function  2.  Preserved systolic function  3.  Hypertension  4.  Dyslipidemia  5.  Chronic tobacco use  6.  Diabetes mellitus    HPI    Patient is a 65-year-old female that presents to the office for evaluation.  She is here for cardiac clearance for EGD.    Patient is had issues recently when she would have difficulty eating food and regurgitating it.  EGD has been scheduled.    She has occasional discomfort that she feels is \"gas\".  She has some indigestion or GI symptoms but she does not describe any other type of chest pain or pressure.  She does not describe any referral radiation.  She has very mild levels of dyspnea.  No progressive shortness of breath.  No PND orthopnea.    Patient does not describe any palpitations or dysrhythmic symptoms and otherwise patient is doing well      Current Outpatient Medications on File Prior to Visit   Medication Sig Dispense Refill   • aspirin 81 MG EC tablet Take 1 tablet by mouth Daily. 30 tablet 11   • atorvastatin (LIPITOR) 80 MG tablet Take 80 mg by mouth every night at bedtime.  0   • escitalopram (LEXAPRO) 20 MG tablet Take 20 mg by mouth Daily.     • esomeprazole (nexIUM) 40 MG capsule Daily.     • gabapentin (NEURONTIN) 800 MG tablet Take 800 mg by mouth 2 (Two) Times a Day.  0   • HYDROcodone-acetaminophen (NORCO) 5-325 MG per tablet Take 1 tablet by mouth Every 8 (Eight) Hours As " Needed. for pain  0   • levothyroxine (SYNTHROID, LEVOTHROID) 125 MCG tablet Take 125 mcg by mouth Daily.  0   • meloxicam (MOBIC) 7.5 MG tablet Take 7.5 mg by mouth Daily.     • METFORMIN HCL ER PO 1,000 mg 2 (Two) Times a Day.  0   • metoprolol succinate XL (TOPROL-XL) 25 MG 24 hr tablet Take 1 tablet by mouth Daily. 90 tablet 3   • nitroglycerin (NITROSTAT) 0.4 MG SL tablet 1 under the tongue as needed for angina, may repeat q5mins for up three doses 25 tablet 2   • PROAIR  (90 Base) MCG/ACT inhaler As Needed.  0   • lisinopril (PRINIVIL,ZESTRIL) 5 MG tablet Take 1 tablet by mouth Daily. (Patient taking differently: Take 10 mg by mouth Daily.) 30 tablet 11     No current facility-administered medications on file prior to visit.        Patient has no known allergies.    Past Medical History:   Diagnosis Date   • Asthma    • Coronary artery disease    • Diabetes mellitus (CMS/HCC)    • Hyperlipidemia    • Hypertension    • Myocardial infarction (CMS/HCC)    • Thyroid disease        Social History     Socioeconomic History   • Marital status: Legally      Spouse name: Not on file   • Number of children: Not on file   • Years of education: Not on file   • Highest education level: Not on file   Tobacco Use   • Smoking status: Current Every Day Smoker     Packs/day: 1.00     Years: 50.00     Pack years: 50.00     Types: Cigarettes   • Smokeless tobacco: Never Used   Substance and Sexual Activity   • Alcohol use: No   • Drug use: No   • Sexual activity: Defer       Family History   Problem Relation Age of Onset   • Heart disease Mother    • Heart attack Mother    • Hypertension Mother    • Heart disease Father    • Heart attack Father    • Hypertension Father    • Heart disease Brother        Review of Systems   Constitutional: Negative.  Negative for chills, fatigue and fever.   HENT: Negative.  Negative for congestion, rhinorrhea and sore throat.    Eyes: Negative.  Negative for visual disturbance.  "  Respiratory: Positive for shortness of breath. Negative for apnea and chest tightness.    Cardiovascular: Positive for chest pain. Negative for palpitations and leg swelling.   Gastrointestinal: Negative.    Endocrine: Negative.    Genitourinary: Negative.    Musculoskeletal: Positive for arthralgias. Negative for back pain, gait problem, neck pain and neck stiffness.   Skin: Negative.  Negative for rash and wound.   Allergic/Immunologic: Negative.  Negative for environmental allergies and food allergies.   Neurological: Negative.  Negative for dizziness, weakness, light-headedness, numbness and headaches.   Hematological: Negative.  Does not bruise/bleed easily.   Psychiatric/Behavioral: Positive for sleep disturbance (difficulty falling/staying asleep- denies SoA at hs).       Objective   Vitals:    01/26/21 1026   BP: 110/67   BP Location: Left arm   Patient Position: Sitting   Pulse: 65   SpO2: 98%   Weight: 94.3 kg (207 lb 12.8 oz)   Height: 160 cm (62.99\")      /67 (BP Location: Left arm, Patient Position: Sitting)   Pulse 65   Ht 160 cm (62.99\")   Wt 94.3 kg (207 lb 12.8 oz)   SpO2 98%   BMI 36.82 kg/m²     Lab Results (most recent)     None          Physical Exam  Vitals signs and nursing note reviewed.   Constitutional:       General: She is not in acute distress.     Appearance: Normal appearance. She is well-developed.   HENT:      Head: Normocephalic and atraumatic.   Eyes:      General: No scleral icterus.        Right eye: No discharge.         Left eye: No discharge.      Conjunctiva/sclera: Conjunctivae normal.   Neck:      Vascular: No carotid bruit.   Cardiovascular:      Rate and Rhythm: Normal rate and regular rhythm.      Heart sounds: Normal heart sounds. No murmur. No friction rub. No gallop.    Pulmonary:      Effort: Pulmonary effort is normal. No respiratory distress.      Breath sounds: Normal breath sounds. No wheezing or rales.   Chest:      Chest wall: No tenderness. "   Musculoskeletal:      Right lower leg: No edema.      Left lower leg: No edema.   Skin:     General: Skin is warm and dry.      Coloration: Skin is not pale.      Findings: No erythema or rash.   Neurological:      Mental Status: She is alert and oriented to person, place, and time.      Cranial Nerves: No cranial nerve deficit.   Psychiatric:         Behavior: Behavior normal.         Procedure     ECG 12 Lead    Date/Time: 5/1/2019 11:33 AM  Performed by: Franky Aldana PA  Authorized by: Franky Aldana PA   Comments: EKG demonstrates sinus rhythm at 64 bpm with right bundle branch block and no acute ST changes               Assessment/Plan     Problems Addressed this Visit        Other    Essential hypertension    Shortness of breath    Coronary artery disease involving native coronary artery of native heart without angina pectoris - Primary      Diagnoses       Codes Comments    Coronary artery disease involving native coronary artery of native heart without angina pectoris    -  Primary ICD-10-CM: I25.10  ICD-9-CM: 414.01     Shortness of breath     ICD-10-CM: R06.02  ICD-9-CM: 786.05     Essential hypertension     ICD-10-CM: I10  ICD-9-CM: 401.9           Recommendation  1.  Patient doing well.  Her chest pain seems atypical related to GI issues.  She is doing well and stress test in 2019 was negative.  For now with no ischemic symptoms and chest pain thought to be atypical, will schedule for testing.    2.  Mild levels of dyspnea at baseline.  No progressive shortness of breath.  No PND or orthopnea.    3.  Patient with baseline hypertension.  Patient is doing well on current medical regimen.  We will see patient back for follow-up in 6 months.  Follow-up with primary as scheduled           Leopoldokathy Bella  reports that she has been smoking cigarettes. She has a 50.00 pack-year smoking history. She has never used smokeless tobacco.. I have educated her on the risk of diseases from using tobacco products  such as cancer, COPD and heart disease.     I advised her to quit and she is not willing to quit.    I spent 3  minutes counseling the patient.          Patient's Body mass index is 36.82 kg/m². BMI is above normal parameters. Recommendations include: educational material.       Electronically signed by:

## 2021-01-27 ENCOUNTER — TELEPHONE (OUTPATIENT)
Dept: CARDIOLOGY | Facility: CLINIC | Age: 66
End: 2021-01-27

## 2021-01-27 NOTE — TELEPHONE ENCOUNTER
Received clearance from Dr. Xiao.    Per Franky, patient cleared. Continue Aspirin. [facility].    Faxed to [phone]

## 2021-02-07 DIAGNOSIS — R07.9 CHEST PAIN, UNSPECIFIED TYPE: Primary | ICD-10-CM

## 2021-02-08 RX ORDER — NITROGLYCERIN 0.4 MG/1
TABLET SUBLINGUAL
Qty: 25 TABLET | Refills: 2 | Status: SHIPPED | OUTPATIENT
Start: 2021-02-08 | End: 2021-05-10

## 2021-03-10 ENCOUNTER — APPOINTMENT (OUTPATIENT)
Dept: WOMENS IMAGING | Facility: HOSPITAL | Age: 66
End: 2021-03-10

## 2021-03-10 PROCEDURE — 77063 BREAST TOMOSYNTHESIS BI: CPT | Performed by: RADIOLOGY

## 2021-03-10 PROCEDURE — 77067 SCR MAMMO BI INCL CAD: CPT | Performed by: RADIOLOGY

## 2021-05-09 DIAGNOSIS — R07.9 CHEST PAIN, UNSPECIFIED TYPE: ICD-10-CM

## 2021-05-10 RX ORDER — NITROGLYCERIN 0.4 MG/1
TABLET SUBLINGUAL
Qty: 25 TABLET | Refills: 2 | Status: SHIPPED | OUTPATIENT
Start: 2021-05-10

## 2021-08-05 ENCOUNTER — OUTSIDE FACILITY SERVICE (OUTPATIENT)
Dept: CARDIOLOGY | Facility: CLINIC | Age: 66
End: 2021-08-05

## 2021-08-05 PROCEDURE — 93018 CV STRESS TEST I&R ONLY: CPT | Performed by: INTERNAL MEDICINE

## 2021-12-01 ENCOUNTER — OFFICE VISIT (OUTPATIENT)
Dept: CARDIOLOGY | Facility: CLINIC | Age: 66
End: 2021-12-01

## 2021-12-01 VITALS
RESPIRATION RATE: 18 BRPM | SYSTOLIC BLOOD PRESSURE: 124 MMHG | HEART RATE: 71 BPM | DIASTOLIC BLOOD PRESSURE: 66 MMHG | WEIGHT: 220 LBS | BODY MASS INDEX: 40.48 KG/M2 | HEIGHT: 62 IN | OXYGEN SATURATION: 96 %

## 2021-12-01 DIAGNOSIS — R06.02 SHORTNESS OF BREATH: Primary | ICD-10-CM

## 2021-12-01 DIAGNOSIS — I10 ESSENTIAL HYPERTENSION: ICD-10-CM

## 2021-12-01 DIAGNOSIS — I25.10 CORONARY ARTERY DISEASE INVOLVING NATIVE CORONARY ARTERY OF NATIVE HEART WITHOUT ANGINA PECTORIS: ICD-10-CM

## 2021-12-01 PROCEDURE — 99213 OFFICE O/P EST LOW 20 MIN: CPT | Performed by: PHYSICIAN ASSISTANT

## 2021-12-01 NOTE — PROGRESS NOTES
Problem list     Subjective   Kathrine Bella is a 66 y.o. female     Chief Complaint   Patient presents with   • Follow-up   Problem list  1.  Nonobstructive coronary artery disease  1.1 cardiac catheterization in the distant past [facility] which was nonobstructive per patient report, inadequate data  1.2 cardiac catheterization in [place] approximately 5 years ago and 2014 that was nonobstructive with moderate disease noted in 2 vessels, inadequate data  1.3 stress test August 2021 with no evidence of ischemia preserved LV function  2.  Preserved systolic function  3.  Hypertension  4.  Dyslipidemia  5.  Chronic tobacco use  6.  Diabetes mellitus    HPI    Patient is a 66-year-old female that presents to the office for evaluation.  She has done well.  She has no complaints of chest pain or pressure.  She apparently was hospitalized in August and underwent stress and echo because of chest pain.  It was negative.  She was found to have significant hypothyroidism with a TSH level approximate 45.  She is underwent treatment    She does not describe any pain or pressure.  She has a degree of mild dyspnea but does not describe progressive shortness of breath no decline in functional status.    She does not describe palpitations or dysrhythmic symptoms.  Otherwise is stable    Current Outpatient Medications on File Prior to Visit   Medication Sig Dispense Refill   • aspirin 81 MG EC tablet Take 1 tablet by mouth Daily. 30 tablet 11   • atorvastatin (LIPITOR) 80 MG tablet Take 80 mg by mouth every night at bedtime.  0   • escitalopram (LEXAPRO) 20 MG tablet Take 20 mg by mouth Daily.     • esomeprazole (nexIUM) 40 MG capsule Daily.     • gabapentin (NEURONTIN) 800 MG tablet Take 600 mg by mouth 3 (Three) Times a Day.  0   • HYDROcodone-acetaminophen (NORCO) 5-325 MG per tablet Take 1 tablet by mouth Every 8 (Eight) Hours As Needed. for pain  0   • levothyroxine (SYNTHROID, LEVOTHROID) 125 MCG tablet Take 125 mcg  by mouth Daily.  0   • lisinopril (PRINIVIL,ZESTRIL) 5 MG tablet Take 1 tablet by mouth Daily. (Patient taking differently: Take 10 mg by mouth Daily.) 30 tablet 11   • meloxicam (MOBIC) 7.5 MG tablet Take 7.5 mg by mouth Daily.     • METFORMIN HCL ER PO 1,000 mg 2 (Two) Times a Day.  0   • metoprolol succinate XL (TOPROL-XL) 25 MG 24 hr tablet Take 1 tablet by mouth Daily. 90 tablet 3   • nitroglycerin (NITROSTAT) 0.4 MG SL tablet TAKE 1 TABLET UNDER THE TONGUE AS NEEDED FOR CHEST PAIN, MAY REPEAT EVERY 5 MINS UP TO 3 DOSES; IF NO RELIEF CALL 911 25 tablet 2   • PROAIR  (90 Base) MCG/ACT inhaler As Needed.  0     No current facility-administered medications on file prior to visit.       Patient has no known allergies.    Past Medical History:   Diagnosis Date   • Asthma    • Coronary artery disease    • Diabetes mellitus (HCC)    • Hyperlipidemia    • Hypertension    • Myocardial infarction (HCC)    • Thyroid disease        Social History     Socioeconomic History   • Marital status: Legally    Tobacco Use   • Smoking status: Current Every Day Smoker     Packs/day: 1.00     Years: 50.00     Pack years: 50.00     Types: Cigarettes   • Smokeless tobacco: Never Used   Substance and Sexual Activity   • Alcohol use: No   • Drug use: No   • Sexual activity: Defer       Family History   Problem Relation Age of Onset   • Heart disease Mother    • Heart attack Mother    • Hypertension Mother    • Heart disease Father    • Heart attack Father    • Hypertension Father    • Heart disease Brother        Review of Systems   Constitutional: Positive for fatigue (with movement.). Negative for activity change, appetite change, chills and fever.   HENT: Negative for congestion, hearing loss, sinus pressure, sinus pain and sore throat.    Eyes: Negative for pain and itching.   Respiratory: Positive for shortness of breath. Negative for apnea, cough, chest tightness and wheezing.    Cardiovascular: Negative for chest  "pain, palpitations and leg swelling.   Gastrointestinal: Negative for abdominal pain, diarrhea, nausea and vomiting.   Endocrine: Negative.  Negative for polydipsia, polyphagia and polyuria.   Genitourinary: Negative.  Negative for difficulty urinating, frequency, pelvic pain and urgency.   Musculoskeletal: Positive for back pain and joint swelling. Negative for neck pain.   Skin: Negative.    Allergic/Immunologic: Negative.    Neurological: Negative for dizziness, syncope, light-headedness and headaches.   Psychiatric/Behavioral: Positive for self-injury (insomnia).       Objective   Vitals:    12/01/21 1432   BP: 124/66   BP Location: Left arm   Patient Position: Sitting   Cuff Size: Adult   Pulse: 71   Resp: 18   SpO2: 96%   Weight: 99.8 kg (220 lb)   Height: 157.5 cm (62\")      /66 (BP Location: Left arm, Patient Position: Sitting, Cuff Size: Adult)   Pulse 71   Resp 18   Ht 157.5 cm (62\")   Wt 99.8 kg (220 lb)   SpO2 96%   BMI 40.24 kg/m²     Lab Results (most recent)     None          Physical Exam  Vitals and nursing note reviewed.   Constitutional:       General: She is not in acute distress.     Appearance: Normal appearance. She is well-developed.   HENT:      Head: Normocephalic and atraumatic.   Eyes:      General: No scleral icterus.        Right eye: No discharge.         Left eye: No discharge.      Conjunctiva/sclera: Conjunctivae normal.   Neck:      Vascular: No carotid bruit.   Cardiovascular:      Rate and Rhythm: Normal rate and regular rhythm.      Heart sounds: Normal heart sounds. No murmur heard.  No friction rub. No gallop.    Pulmonary:      Effort: Pulmonary effort is normal. No respiratory distress.      Breath sounds: Normal breath sounds. No wheezing or rales.   Chest:      Chest wall: No tenderness.   Musculoskeletal:      Right lower leg: No edema.      Left lower leg: No edema.   Skin:     General: Skin is warm and dry.      Coloration: Skin is not pale.      Findings: " No erythema or rash.   Neurological:      Mental Status: She is alert and oriented to person, place, and time.      Cranial Nerves: No cranial nerve deficit.   Psychiatric:         Behavior: Behavior normal.         Procedure   Procedures       Assessment/Plan     Problems Addressed this Visit        Cardiac and Vasculature    Essential hypertension    Coronary artery disease involving native coronary artery of native heart without angina pectoris       Pulmonary and Pneumonias    Shortness of breath - Primary      Diagnoses       Codes Comments    Shortness of breath    -  Primary ICD-10-CM: R06.02  ICD-9-CM: 786.05     Coronary artery disease involving native coronary artery of native heart without angina pectoris     ICD-10-CM: I25.10  ICD-9-CM: 414.01     Essential hypertension     ICD-10-CM: I10  ICD-9-CM: 401.9             Recommendation  1.  Patient with coronary artery disease that is doing well at this point.  Patient does not complains of ischemia with recent stress test is negative.    2.  Patient has baseline hypertension but doing well on current medical therapy.  For now we will continue    3.  Patient with mild levels of dyspnea but otherwise appears stable and she has good LV function.  For now nothing further, we will see patient back for follow-up in 6 months.  Follow-up with primary as scheduled             Electronically signed by:

## 2022-01-20 DIAGNOSIS — R00.2 PALPITATIONS: Primary | ICD-10-CM

## 2022-01-20 RX ORDER — METOPROLOL SUCCINATE 25 MG/1
25 TABLET, EXTENDED RELEASE ORAL DAILY
Qty: 90 TABLET | Refills: 3 | Status: SHIPPED | OUTPATIENT
Start: 2022-01-20

## 2022-06-01 ENCOUNTER — OFFICE VISIT (OUTPATIENT)
Dept: CARDIOLOGY | Facility: CLINIC | Age: 67
End: 2022-06-01

## 2022-06-01 VITALS
WEIGHT: 214 LBS | HEIGHT: 62 IN | BODY MASS INDEX: 39.38 KG/M2 | SYSTOLIC BLOOD PRESSURE: 98 MMHG | DIASTOLIC BLOOD PRESSURE: 64 MMHG | HEART RATE: 76 BPM | OXYGEN SATURATION: 92 %

## 2022-06-01 DIAGNOSIS — I73.9 INTERMITTENT CLAUDICATION: ICD-10-CM

## 2022-06-01 DIAGNOSIS — I25.119 CORONARY ARTERY DISEASE INVOLVING NATIVE CORONARY ARTERY OF NATIVE HEART WITH ANGINA PECTORIS: Primary | ICD-10-CM

## 2022-06-01 DIAGNOSIS — R09.89 CAROTID BRUIT, UNSPECIFIED LATERALITY: ICD-10-CM

## 2022-06-01 DIAGNOSIS — I20.8 ANGINA AT REST: ICD-10-CM

## 2022-06-01 DIAGNOSIS — R06.09 DYSPNEA ON EXERTION: ICD-10-CM

## 2022-06-01 PROBLEM — M48.061 SPINAL STENOSIS OF LUMBAR REGION: Status: ACTIVE | Noted: 2021-10-14

## 2022-06-01 PROBLEM — G89.11 ACUTE TRAUMATIC PAIN: Status: ACTIVE | Noted: 2020-06-27

## 2022-06-01 PROBLEM — I20.89 ANGINA AT REST: Status: ACTIVE | Noted: 2022-06-01

## 2022-06-01 PROBLEM — J44.9 COPD (CHRONIC OBSTRUCTIVE PULMONARY DISEASE) (HCC): Status: ACTIVE | Noted: 2020-06-27

## 2022-06-01 PROBLEM — S22.49XA MULTIPLE RIB FRACTURES INVOLVING FOUR OR MORE RIBS: Status: ACTIVE | Noted: 2020-06-26

## 2022-06-01 PROBLEM — G89.29 CHRONIC LOW BACK PAIN: Status: ACTIVE | Noted: 2021-09-22
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
## 2022-06-01 PROBLEM — M54.50 CHRONIC LOW BACK PAIN: Status: ACTIVE | Noted: 2021-09-22

## 2022-06-01 PROCEDURE — 99214 OFFICE O/P EST MOD 30 MIN: CPT | Performed by: PHYSICIAN ASSISTANT

## 2022-06-01 RX ORDER — CLOPIDOGREL BISULFATE 75 MG/1
75 TABLET ORAL DAILY
Qty: 30 TABLET | Refills: 11 | Status: SHIPPED | OUTPATIENT
Start: 2022-06-01

## 2022-06-01 RX ORDER — LISINOPRIL 10 MG/1
10 TABLET ORAL DAILY
Qty: 30 TABLET | Refills: 11 | Status: SHIPPED | OUTPATIENT
Start: 2022-06-01

## 2022-06-01 RX ORDER — TIZANIDINE 4 MG/1
4 TABLET ORAL
COMMUNITY
Start: 2022-05-19

## 2022-06-01 RX ORDER — GABAPENTIN 600 MG/1
TABLET ORAL
COMMUNITY

## 2022-06-01 RX ORDER — METFORMIN HYDROCHLORIDE 500 MG/1
TABLET, EXTENDED RELEASE ORAL
COMMUNITY
Start: 2022-03-30

## 2022-06-01 RX ORDER — FUROSEMIDE 20 MG/1
TABLET ORAL
COMMUNITY
End: 2022-06-01

## 2022-06-01 RX ORDER — MELOXICAM 15 MG/1
15 TABLET ORAL DAILY
COMMUNITY
Start: 2022-05-19 | End: 2022-06-01 | Stop reason: DRUGHIGH

## 2022-06-01 RX ORDER — LISINOPRIL 20 MG/1
20 TABLET ORAL DAILY
COMMUNITY
Start: 2022-05-26 | End: 2022-06-01

## 2022-06-01 RX ORDER — RANOLAZINE 500 MG/1
500 TABLET, EXTENDED RELEASE ORAL 2 TIMES DAILY
Qty: 60 TABLET | Refills: 5 | Status: SHIPPED | OUTPATIENT
Start: 2022-06-01 | End: 2022-11-18

## 2022-06-01 RX ORDER — BUDESONIDE AND FORMOTEROL FUMARATE DIHYDRATE 80; 4.5 UG/1; UG/1
1 AEROSOL RESPIRATORY (INHALATION) DAILY
COMMUNITY
Start: 2022-03-30

## 2022-06-01 NOTE — PROGRESS NOTES
Problem list     Subjective   Kathrine Bella is a 66 y.o. female     Chief Complaint   Patient presents with   • Coronary Artery Disease   • Shortness of Breath     X 3-4 days   Problem list  1.  Nonobstructive coronary artery disease  1.1 cardiac catheterization in the distant past [facility] which was nonobstructive per patient report, inadequate data  1.2 cardiac catheterization in [place] approximately 5 years ago and 2014 that was nonobstructive with moderate disease noted in 2 vessels, inadequate data  1.3 stress test August 2021 with no evidence of ischemia preserved LV function  1.4 continued symptoms on antianginal therapy  2.  Preserved systolic function  3.  Hypertension  4.  Dyslipidemia  5.  Chronic tobacco use  6.  Diabetes mellitus       HPI    Patient is a 66-year-old female who presents to the office for evaluation.  She has history of nonobstructive coronary disease as described above.  She had noninvasive testing to include stress test in August and has been treated medically because of no evidence of ischemia.    Approximately 5 days ago, she had symptoms that were quite concerning.  She described feeling chest discomfort in the substernal region and then describes breaking out in a sweat.  This is occurred at rest and it eventually subsided but was quite intense in description.    Since that time, patient has had quite profound levels of exertional dyspnea.  She describes that walking to her car and back she would be significantly short of breath at all this seems to have gotten much worse since that initial event of significant chest pain and diaphoresis.  Since that time, she has had profound levels of fatigue and weakness.    She does not describe PND, orthopnea or edema    She does complain of significant pain when walking in her lower extremities.  She pain in her lower extremities below the knee bilaterally.  She does not describe palpitations.  She has noted to be hypotensive  recently but otherwise is stable currently    Current Outpatient Medications on File Prior to Visit   Medication Sig Dispense Refill   • aspirin 81 MG EC tablet Take 1 tablet by mouth Daily. 30 tablet 11   • atorvastatin (LIPITOR) 80 MG tablet Take 80 mg by mouth every night at bedtime.  0   • budesonide-formoterol (SYMBICORT) 80-4.5 MCG/ACT inhaler 1 puff Daily.     • escitalopram (LEXAPRO) 20 MG tablet Take 20 mg by mouth Daily.     • esomeprazole (nexIUM) 40 MG capsule Daily.     • gabapentin (NEURONTIN) 600 MG tablet gabapentin 600 mg tablet   TAKE 1 TABLET BY MOUTH THREE TIMES DAILY     • HYDROcodone-acetaminophen (NORCO) 5-325 MG per tablet Take 1 tablet by mouth Every 8 (Eight) Hours As Needed. for pain  0   • levothyroxine (SYNTHROID, LEVOTHROID) 125 MCG tablet Take 125 mcg by mouth Daily.  0   • meloxicam (MOBIC) 7.5 MG tablet Take 7.5 mg by mouth Daily.     • metFORMIN ER (GLUCOPHAGE-XR) 500 MG 24 hr tablet TAKE ONE TABLET BY MOUTH EVERY MORNING AND THEN TAKE 2 TABLETS BY MOUTH EVERY EVENING     • metoprolol succinate XL (TOPROL-XL) 25 MG 24 hr tablet Take 1 tablet by mouth Daily. 90 tablet 3   • nitroglycerin (NITROSTAT) 0.4 MG SL tablet TAKE 1 TABLET UNDER THE TONGUE AS NEEDED FOR CHEST PAIN, MAY REPEAT EVERY 5 MINS UP TO 3 DOSES; IF NO RELIEF CALL 911 25 tablet 2   • PROAIR  (90 Base) MCG/ACT inhaler As Needed.  0   • tiZANidine (ZANAFLEX) 4 MG tablet Take 4 mg by mouth every night at bedtime.     • [DISCONTINUED] lisinopril (PRINIVIL,ZESTRIL) 20 MG tablet Take 20 mg by mouth Daily.     • [DISCONTINUED] furosemide (LASIX) 20 MG tablet furosemide 20 mg tablet     • [DISCONTINUED] gabapentin (NEURONTIN) 800 MG tablet Take 600 mg by mouth 3 (Three) Times a Day.  0   • [DISCONTINUED] lisinopril (PRINIVIL,ZESTRIL) 5 MG tablet Take 1 tablet by mouth Daily. (Patient taking differently: Take 10 mg by mouth Daily.) 30 tablet 11   • [DISCONTINUED] meloxicam (MOBIC) 15 MG tablet Take 15 mg by mouth Daily.      • [DISCONTINUED] METFORMIN HCL ER PO 1,000 mg 2 (Two) Times a Day.  0     No current facility-administered medications on file prior to visit.       Patient has no known allergies.    Past Medical History:   Diagnosis Date   • Asthma    • COPD (chronic obstructive pulmonary disease) (HCC) 6/27/2020   • Coronary artery disease    • Diabetes mellitus (HCC)    • Hyperlipidemia    • Hypertension    • Myocardial infarction (HCC)    • Thyroid disease        Social History     Socioeconomic History   • Marital status: Legally    Tobacco Use   • Smoking status: Current Every Day Smoker     Packs/day: 1.00     Years: 50.00     Pack years: 50.00     Types: Cigarettes   • Smokeless tobacco: Never Used   Substance and Sexual Activity   • Alcohol use: No   • Drug use: No   • Sexual activity: Defer       Family History   Problem Relation Age of Onset   • Heart disease Mother    • Heart attack Mother    • Hypertension Mother    • Heart disease Father    • Heart attack Father    • Hypertension Father    • Heart disease Brother        Review of Systems   Constitutional: Negative.  Negative for chills and fever.   HENT: Negative.  Negative for congestion and sinus pressure.    Respiratory: Positive for shortness of breath (x 4 days when walking). Negative for chest tightness.    Cardiovascular: Positive for chest pain (reports burning across chest a few days ago). Negative for palpitations and leg swelling.   Gastrointestinal: Negative.  Negative for abdominal pain, blood in stool, constipation, diarrhea, nausea and vomiting.   Endocrine: Negative.  Negative for cold intolerance and heat intolerance.   Genitourinary: Negative.  Negative for dysuria, frequency, hematuria and urgency.   Neurological: Positive for dizziness (when up moving around at times). Negative for syncope and light-headedness.   Psychiatric/Behavioral: Negative for sleep disturbance (denies waking with soa or cp).       Objective   Vitals:    06/01/22  "1431   BP: 98/64   BP Location: Left arm   Patient Position: Sitting   Pulse: 76   SpO2: 92%   Weight: 97.1 kg (214 lb)   Height: 157.5 cm (62\")      BP 98/64 (BP Location: Left arm, Patient Position: Sitting)   Pulse 76   Ht 157.5 cm (62\")   Wt 97.1 kg (214 lb)   SpO2 92%   BMI 39.14 kg/m²     Lab Results (most recent)     None          Physical Exam  Vitals and nursing note reviewed.   Constitutional:       General: She is not in acute distress.     Appearance: Normal appearance. She is well-developed.   HENT:      Head: Normocephalic and atraumatic.   Eyes:      General: No scleral icterus.        Right eye: No discharge.         Left eye: No discharge.      Conjunctiva/sclera: Conjunctivae normal.   Neck:      Vascular: Carotid bruit present.   Cardiovascular:      Rate and Rhythm: Normal rate and regular rhythm.      Pulses:           Dorsalis pedis pulses are 0 on the right side and 0 on the left side.      Heart sounds: Normal heart sounds. No murmur heard.    No friction rub. No gallop.   Pulmonary:      Effort: Pulmonary effort is normal. No respiratory distress.      Breath sounds: Normal breath sounds. No wheezing or rales.   Chest:      Chest wall: No tenderness.   Musculoskeletal:      Right lower leg: No edema.      Left lower leg: No edema.   Skin:     General: Skin is warm and dry.      Coloration: Skin is not pale.      Findings: No erythema or rash.   Neurological:      Mental Status: She is alert and oriented to person, place, and time.      Cranial Nerves: No cranial nerve deficit.   Psychiatric:         Behavior: Behavior normal.         Procedure   Procedures       Assessment & Plan     Problems Addressed this Visit        Cardiac and Vasculature    Dyspnea on exertion    Relevant Orders    Duplex Carotid Ultrasound CAR    Duplex Lower Extremity Art / Grafts - Bilateral CAR    [facility] [place] [facility]    Coronary artery disease involving native coronary artery of native heart with angina " pectoris (HCC) - Primary    Relevant Medications    ranolazine (Ranexa) 500 MG 12 hr tablet    clopidogrel (PLAVIX) 75 MG tablet    Other Relevant Orders    Duplex Carotid Ultrasound CAR    Duplex Lower Extremity Art / Grafts - Bilateral CAR    [facility] [place] [facility]    Angina at rest (HCC)    Relevant Medications    ranolazine (Ranexa) 500 MG 12 hr tablet    clopidogrel (PLAVIX) 75 MG tablet    Other Relevant Orders    Duplex Carotid Ultrasound CAR    Duplex Lower Extremity Art / Grafts - Bilateral CAR    [facility] [place] [facility]    Intermittent claudication (HCC)    Relevant Orders    Duplex Carotid Ultrasound CAR    Duplex Lower Extremity Art / Grafts - Bilateral CAR    [facility] [place] [facility]    Carotid bruit    Relevant Orders    Duplex Carotid Ultrasound CAR    Duplex Lower Extremity Art / Grafts - Bilateral CAR    [facility] [place] [facility]      Diagnoses       Codes Comments    Coronary artery disease involving native coronary artery of native heart with angina pectoris (HCC)    -  Primary ICD-10-CM: I25.119  ICD-9-CM: 414.01, 413.9     Dyspnea on exertion     ICD-10-CM: R06.00  ICD-9-CM: 786.09     Angina at rest (HCC)     ICD-10-CM: I20.8  ICD-9-CM: 413.9     Intermittent claudication (HCC)     ICD-10-CM: I73.9  ICD-9-CM: 443.9     Carotid bruit, unspecified laterality     ICD-10-CM: R09.89  ICD-9-CM: 785.9           Recommendation  1.  Patient is a 66-year-old female that has history of coronary disease and apparently moderate two-vessel disease by catheterization in the distant past.  She had stress test showing no evidence of ischemia and has been treated medically and has been on antianginal therapy.    She describes symptoms approximately 5 days ago that, by description, is concerning for an acute coronary event.  She developed chest pain at rest with associated diaphoresis and significant weakness but it eventually resolved.  She currently does not have any chest discomfort but describes that since that time, she  has profound levels of shortness of breath and significant complaints of weakness.  Patient's risk factors for coronary disease are quite significant to include tobacco use, diabetes mellitus and hypertension.  I feel that expedited catheterization is warranted for symptoms concerning for unstable angina.  To be clear, she is not having any pain right now and does not seem to want hospitalization or admission at this point.  Therefore, we are trying to expedite outpatient catheterization.  I spoke to front office staff who will try to coordinate with Dr. Dutta for catheterization.  In the interim, we are placing on dual antiplatelet therapy, continuing high-dose statin, adding further antianginal therapy and giving her clear instructions on when to go to the emergency room.  Any chest pain, not resolved by nitroglycerin, we recommend ER evaluation as discussed with her.  She verbalizes understanding    2.  I am decreasing lisinopril to 10 mg daily and do not recommend her taking any more blood pressure medication today.  We will need to monitor blood pressure closely    3.  With carotid bruit, would like to schedule carotid duplex.  With complaints of claudication and decreased pulses, would like to schedule arterial duplex    4.  We will see patient back for follow-up after catheterization and recommend further.  She is to follow with primary as scheduled           Kathrine Bella  reports that she has been smoking cigarettes. She has a 50.00 pack-year smoking history. She has never used smokeless tobacco.. I have educated her on the risk of diseases from using tobacco products such as cancer, COPD and heart disease.     I advised her to quit and she is not willing to quit.    I spent 3  minutes counseling the patient.       Patient did not bring med list or medicine bottles to appointment, med list has been reviewed and updated based on patient's knowledge of their meds.     Advance Care Planning   ACP discussion was  held with the patient during this visit. Patient does not have an advance directive, information provided.            Electronically signed by:

## 2022-06-13 ENCOUNTER — TELEPHONE (OUTPATIENT)
Dept: CARDIOLOGY | Facility: CLINIC | Age: 67
End: 2022-06-13

## 2022-06-13 NOTE — TELEPHONE ENCOUNTER
Spoke with patient and she wanted to confirm she is to be taking Plavix and Metoprolol.    I advised Metoprolol is used for Hr/Bp and Plavix is a blood thinner/heart medication.    Patient verbally understands.    DIANDRA MATT MA

## 2022-06-13 NOTE — TELEPHONE ENCOUNTER
Patient called and left message on the triage regarding a medication question.    No answer, left VM for patient to return call to get more clarification.    DIANDRA MATT MA

## 2022-11-18 RX ORDER — RANOLAZINE 500 MG/1
TABLET, EXTENDED RELEASE ORAL
Qty: 60 TABLET | Refills: 5 | Status: SHIPPED | OUTPATIENT
Start: 2022-11-18

## 2022-12-01 ENCOUNTER — OFFICE VISIT (OUTPATIENT)
Dept: CARDIOLOGY | Facility: CLINIC | Age: 67
End: 2022-12-01

## 2022-12-01 VITALS
DIASTOLIC BLOOD PRESSURE: 60 MMHG | HEART RATE: 82 BPM | BODY MASS INDEX: 35.11 KG/M2 | HEIGHT: 62 IN | WEIGHT: 190.8 LBS | OXYGEN SATURATION: 94 % | SYSTOLIC BLOOD PRESSURE: 110 MMHG

## 2022-12-01 DIAGNOSIS — I10 ESSENTIAL HYPERTENSION: ICD-10-CM

## 2022-12-01 DIAGNOSIS — I25.10 CORONARY ARTERY DISEASE INVOLVING NATIVE CORONARY ARTERY OF NATIVE HEART WITHOUT ANGINA PECTORIS: Primary | ICD-10-CM

## 2022-12-01 DIAGNOSIS — R06.02 SHORTNESS OF BREATH: ICD-10-CM

## 2022-12-01 PROCEDURE — 99214 OFFICE O/P EST MOD 30 MIN: CPT | Performed by: PHYSICIAN ASSISTANT

## 2022-12-01 RX ORDER — AMOXICILLIN AND CLAVULANATE POTASSIUM 875; 125 MG/1; MG/1
1 TABLET, FILM COATED ORAL 2 TIMES DAILY
COMMUNITY

## 2022-12-01 RX ORDER — GUAIFENESIN 200 MG/1
600 TABLET ORAL 2 TIMES DAILY
COMMUNITY

## 2022-12-01 RX ORDER — PREDNISONE 10 MG/1
20 TABLET ORAL 2 TIMES DAILY
COMMUNITY
Start: 2022-09-08

## 2022-12-01 RX ORDER — HYDROXYZINE HYDROCHLORIDE 10 MG/1
10 TABLET, FILM COATED ORAL 3 TIMES DAILY PRN
COMMUNITY
Start: 2022-10-17

## 2022-12-01 RX ORDER — AZITHROMYCIN 250 MG/1
250 TABLET, FILM COATED ORAL DAILY
COMMUNITY

## 2022-12-01 RX ORDER — NICOTINE 21-14-7MG
1 KIT TRANSDERMAL DAILY
Qty: 1 EACH | Refills: 0 | Status: SHIPPED | OUTPATIENT
Start: 2022-12-01

## 2022-12-01 RX ORDER — MONTELUKAST SODIUM 10 MG/1
10 TABLET ORAL DAILY
COMMUNITY
Start: 2022-09-04

## 2022-12-01 NOTE — PROGRESS NOTES
Problem list     Subjective   Kathrine Bella is a 67 y.o. female     Chief Complaint   Patient presents with   • Follow-up     Cath FU   • Shortness of Breath   • Fatigue     Problem list  1.  Nonobstructive coronary artery disease  1.1 cardiac catheterization in the distant past [facility] which was nonobstructive per patient report, inadequate data  1.2 cardiac catheterization in [place] approximately 5 years ago and 2014 that was nonobstructive with moderate disease noted in 2 vessels, inadequate data  1.3 repeat cath June 2022 with no significant coronary disease noted and preserved LV function  2.  Preserved systolic function  3.  Hypertension  4.  Dyslipidemia  5.  Chronic tobacco use  6.  Diabetes mellitus  HPI    Patient is a 67-year-old female that presents back to the office for follow-up.  She recently was discharged from the hospital after being diagnosed with pneumonia.    She is feeling better.  She does not describe chest pain or pressure but does have a degree of dyspnea which is to be expected although that has improved.  She was on oxygen but describes being able to be off of it now.  She does not describe PND, orthopnea or edema    She does not experience palpitations nor does she complain of any dysrhythmic symptoms.  She feels well at this time      Current Outpatient Medications on File Prior to Visit   Medication Sig Dispense Refill   • amoxicillin-clavulanate (AUGMENTIN) 875-125 MG per tablet Take 1 tablet by mouth 2 (Two) Times a Day.     • aspirin 81 MG EC tablet Take 1 tablet by mouth Daily. 30 tablet 11   • atorvastatin (LIPITOR) 80 MG tablet Take 80 mg by mouth every night at bedtime.  0   • azithromycin (ZITHROMAX) 250 MG tablet Take 250 mg by mouth Daily.     • budesonide-formoterol (SYMBICORT) 80-4.5 MCG/ACT inhaler 1 puff Daily.     • clopidogrel (PLAVIX) 75 MG tablet Take 1 tablet by mouth Daily. 30 tablet 11   • escitalopram (LEXAPRO) 20 MG tablet Take 20 mg by mouth  Daily.     • gabapentin (NEURONTIN) 600 MG tablet gabapentin 600 mg tablet   TAKE 1 TABLET BY MOUTH THREE TIMES DAILY     • guaiFENesin 200 MG tablet Take 600 mg by mouth 2 (Two) Times a Day.     • HYDROcodone-Acetaminophen (ZAMCET)  MG/15ML solution Take 1 tablet by mouth Every 8 (Eight) Hours As Needed. for pain  0   • hydrOXYzine (ATARAX) 10 MG tablet Take 10 mg by mouth 3 (Three) Times a Day As Needed.     • levothyroxine (SYNTHROID, LEVOTHROID) 125 MCG tablet Take 125 mcg by mouth Daily.  0   • meloxicam (MOBIC) 7.5 MG tablet Take 7.5 mg by mouth Daily.     • metFORMIN ER (GLUCOPHAGE-XR) 500 MG 24 hr tablet TAKE ONE TABLET BY MOUTH EVERY MORNING AND THEN TAKE 2 TABLETS BY MOUTH EVERY EVENING     • metoprolol succinate XL (TOPROL-XL) 25 MG 24 hr tablet Take 1 tablet by mouth Daily. 90 tablet 3   • montelukast (SINGULAIR) 10 MG tablet Take 10 mg by mouth Daily.     • nitroglycerin (NITROSTAT) 0.4 MG SL tablet TAKE 1 TABLET UNDER THE TONGUE AS NEEDED FOR CHEST PAIN, MAY REPEAT EVERY 5 MINS UP TO 3 DOSES; IF NO RELIEF CALL 911 25 tablet 2   • predniSONE (DELTASONE) 10 MG tablet Take 20 mg by mouth 2 (Two) Times a Day.     • ranolazine (RANEXA) 500 MG 12 hr tablet TAKE 1 TABLET BY MOUTH TWICE DAILY 60 tablet 5   • esomeprazole (nexIUM) 40 MG capsule Daily.     • lisinopril (PRINIVIL,ZESTRIL) 10 MG tablet Take 1 tablet by mouth Daily. 30 tablet 11   • PROAIR  (90 Base) MCG/ACT inhaler As Needed.  0   • tiZANidine (ZANAFLEX) 4 MG tablet Take 4 mg by mouth every night at bedtime.       No current facility-administered medications on file prior to visit.       Patient has no known allergies.    Past Medical History:   Diagnosis Date   • Asthma    • COPD (chronic obstructive pulmonary disease) (HCC) 06/27/2020   • Coronary artery disease    • Diabetes mellitus (HCC)    • Hyperlipidemia    • Hypertension    • Myocardial infarction (HCC)    • Pneumonia    • Thyroid disease        Social History  "    Socioeconomic History   • Marital status:    Tobacco Use   • Smoking status: Every Day     Packs/day: 1.00     Years: 50.00     Pack years: 50.00     Types: Cigarettes   • Smokeless tobacco: Never   Vaping Use   • Vaping Use: Never used   Substance and Sexual Activity   • Alcohol use: No   • Drug use: No   • Sexual activity: Defer       Family History   Problem Relation Age of Onset   • Heart disease Mother    • Heart attack Mother    • Hypertension Mother    • Heart disease Father    • Heart attack Father    • Hypertension Father    • Heart disease Brother        Review of Systems   Constitutional: Positive for fatigue. Negative for chills and fever.   HENT: Negative.  Negative for congestion, ear discharge, ear pain, hearing loss, sinus pressure, sneezing and sore throat.    Eyes: Negative.  Negative for pain, discharge, redness, itching and visual disturbance.   Respiratory: Positive for shortness of breath. Negative for cough, chest tightness and wheezing.    Cardiovascular: Negative.  Negative for chest pain, palpitations and leg swelling.   Gastrointestinal: Negative.  Negative for abdominal pain, blood in stool, constipation, diarrhea, nausea and vomiting.   Endocrine: Negative.    Genitourinary: Negative.  Negative for flank pain and hematuria.   Musculoskeletal: Positive for back pain. Negative for joint swelling and neck pain.   Skin: Negative.    Allergic/Immunologic: Negative.    Neurological: Negative.  Negative for dizziness, seizures, syncope, weakness, light-headedness and numbness.   Hematological: Negative.    Psychiatric/Behavioral: Negative.  Negative for sleep disturbance.       Objective   Vitals:    12/01/22 1047   BP: 110/60   BP Location: Left arm   Patient Position: Sitting   Cuff Size: Adult   Pulse: 82   SpO2: 94%   Weight: 86.5 kg (190 lb 12.8 oz)   Height: 157.5 cm (62\")      /60 (BP Location: Left arm, Patient Position: Sitting, Cuff Size: Adult)   Pulse 82   Ht " "157.5 cm (62\")   Wt 86.5 kg (190 lb 12.8 oz)   SpO2 94%   BMI 34.90 kg/m²     Lab Results (most recent)     None          Physical Exam  Vitals and nursing note reviewed.   Constitutional:       General: She is not in acute distress.     Appearance: Normal appearance. She is well-developed.   HENT:      Head: Normocephalic and atraumatic.   Eyes:      General: No scleral icterus.        Right eye: No discharge.         Left eye: No discharge.      Conjunctiva/sclera: Conjunctivae normal.   Neck:      Vascular: No carotid bruit.   Cardiovascular:      Rate and Rhythm: Normal rate and regular rhythm.      Heart sounds: Normal heart sounds. No murmur heard.    No friction rub. No gallop.   Pulmonary:      Effort: Pulmonary effort is normal. No respiratory distress.      Breath sounds: Normal breath sounds. No wheezing or rales.   Chest:      Chest wall: No tenderness.   Musculoskeletal:      Right lower leg: No edema.      Left lower leg: No edema.   Skin:     General: Skin is warm and dry.      Coloration: Skin is not pale.      Findings: No erythema or rash.   Neurological:      Mental Status: She is alert and oriented to person, place, and time.      Cranial Nerves: No cranial nerve deficit.   Psychiatric:         Behavior: Behavior normal.         Procedure   Procedures       Assessment & Plan     Problems Addressed this Visit        Cardiac and Vasculature    Essential hypertension    Coronary artery disease involving native coronary artery of native heart without angina pectoris - Primary       Pulmonary and Pneumonias    Shortness of breath   Diagnoses       Codes Comments    Coronary artery disease involving native coronary artery of native heart without angina pectoris    -  Primary ICD-10-CM: I25.10  ICD-9-CM: 414.01     Essential hypertension     ICD-10-CM: I10  ICD-9-CM: 401.9     Shortness of breath     ICD-10-CM: R06.02  ICD-9-CM: 786.05         Recommendation  1.  Patient is a 67-year-old female that " presents back to the office for routine cardiac follow-up and assessment.  She has nonobstructive coronaries and very minimal disease.  From our standpoint, continue risk factor modification to include lipid management.  She has labs monitored by primary    2.  She does request medication regards to smoking cessation.  She has been short of breath and recently hospitalized for pneumonia.  We will send in nicotine patches to help from that standpoint    3.  Otherwise blood pressure currently stable controlled on current medical regimen.  I will make no changes at this time    4.  She is doing well in recovery.  We will see her back for follow-up in 6 months or sooner as symptoms discussed.  Follow-up with primary as scheduled.           Advance Care Planning   ACP discussion was held with the patient during this visit. Patient does not have an advance directive, declines further assistance.        Electronically signed by:

## 2023-05-04 RX ORDER — RANOLAZINE 500 MG/1
TABLET, EXTENDED RELEASE ORAL
Qty: 60 TABLET | Refills: 5 | Status: SHIPPED | OUTPATIENT
Start: 2023-05-04

## 2023-05-26 ENCOUNTER — TELEPHONE (OUTPATIENT)
Dept: CARDIOLOGY | Facility: CLINIC | Age: 68
End: 2023-05-26
Payer: MEDICARE

## 2023-07-20 ENCOUNTER — HOSPITAL ENCOUNTER (EMERGENCY)
Facility: HOSPITAL | Age: 68
Discharge: HOME | End: 2023-07-21
Attending: STUDENT IN AN ORGANIZED HEALTH CARE EDUCATION/TRAINING PROGRAM | Admitting: STUDENT IN AN ORGANIZED HEALTH CARE EDUCATION/TRAINING PROGRAM
Payer: MEDICARE

## 2023-07-20 ENCOUNTER — APPOINTMENT (OUTPATIENT)
Dept: GENERAL RADIOLOGY | Facility: HOSPITAL | Age: 68
End: 2023-07-20
Payer: MEDICARE

## 2023-07-20 DIAGNOSIS — Z86.79 HISTORY OF HYPERTENSION: ICD-10-CM

## 2023-07-20 DIAGNOSIS — S80.01XA CONTUSION OF RIGHT KNEE, INITIAL ENCOUNTER: ICD-10-CM

## 2023-07-20 DIAGNOSIS — Z86.79 HISTORY OF CORONARY ARTERY DISEASE: ICD-10-CM

## 2023-07-20 DIAGNOSIS — S93.601A SPRAIN OF RIGHT FOOT, INITIAL ENCOUNTER: ICD-10-CM
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
## 2023-07-20 DIAGNOSIS — Z86.39 HISTORY OF HYPERLIPIDEMIA: ICD-10-CM

## 2023-07-20 DIAGNOSIS — Z86.39 HISTORY OF DIABETES MELLITUS: ICD-10-CM

## 2023-07-20 PROCEDURE — 99283 EMERGENCY DEPT VISIT LOW MDM: CPT

## 2023-07-20 PROCEDURE — 73560 X-RAY EXAM OF KNEE 1 OR 2: CPT

## 2023-07-20 PROCEDURE — 73630 X-RAY EXAM OF FOOT: CPT

## 2023-07-20 NOTE — Clinical Note
[facility] EMERGENCY DEPARTMENT  [address]  [facility] [address]  Phone: [phone]    Kathrine Bella was seen and treated in our emergency department on 7/20/2023.  She may return to work on 07/23/2023.  ?       Thank you for choosing [facility].    Pam Del Castillo, ADITHYA

## 2023-07-21 VITALS
OXYGEN SATURATION: 97 % | TEMPERATURE: 98 F | BODY MASS INDEX: 37.91 KG/M2 | RESPIRATION RATE: 18 BRPM | HEART RATE: 70 BPM | HEIGHT: 62 IN | WEIGHT: 206 LBS | DIASTOLIC BLOOD PRESSURE: 78 MMHG | SYSTOLIC BLOOD PRESSURE: 138 MMHG

## 2023-07-21 NOTE — ED PROVIDER NOTES
"Subjective   History of Present Illness  This is a 67-year-old female that presents the ER with mechanical fall that occurred 9 hours ago.  Patient says that she was at her nieces and her nieces shiv was walking past patient and in between a garbage can.  The dog noted to the patient and caused her to lose balance and fall onto her right knee.  She reports right knee pain with superficial abrasion and also reports pain to the right dorsal/lateral aspect of right foot.  There is no obvious deformity.  Patient denies any head injury or loss of consciousness.  She feels \"sore all over\".  Past medical history is significant for hypertension, hyperlipidemia, coronary artery disease, COPD, spinal stenosis of the lumbar region with chronic use of Norco, hypothyroidism, and type 2 diabetes mellitus.  Patient has taken her routine pain medication but no other OTC medications tried.  Patient says that she has had 2 arthroscopic procedures to the right knee in the past.  No previous right knee fracture.  No other concerns at this time.    History provided by:  Patient  Fall  Mechanism of injury: fall    Time since incident:  9 hours  Arrived directly from scene: no    Fall:     Fall occurred:  Standing    Impact surface:  [place]    Entrapped after fall: no    Suspicion of alcohol use: no    Suspicion of drug use: no    Prior to arrival data:     Loss of consciousness: no      Amnesic to event: no    Associated symptoms: no abdominal pain, no back pain, no chest pain, no difficulty breathing, no headaches, no hearing loss, no loss of consciousness, no nausea, no neck pain and no vomiting    Risk factors: anticoagulation therapy (Pt on Plavix and aspirin.)      Review of Systems   Constitutional: Negative.    HENT:  Negative for hearing loss.    Respiratory: Negative.     Cardiovascular: Negative.  Negative for chest pain, palpitations and leg swelling.        History of CAD.  Patient denies any chest pain or shortness of " breath.   Gastrointestinal:  Negative for abdominal pain, nausea and vomiting.   Genitourinary: Negative.  Negative for dysuria, flank pain, frequency and urgency.   Musculoskeletal:  Positive for arthralgias (Right knee and right lateral foot pain) and joint swelling (mild swelling to right knee.  History of arthroscopic procedures x 2 to right knee in the past.). Negative for back pain and neck pain.   Skin:  Positive for wound (superficial abrasion to right knee.). Negative for color change.        Mild swelling.   Neurological: Negative.  Negative for loss of consciousness, syncope and headaches.        No head injury or LOC.   All other systems reviewed and are negative.    Past Medical History:   Diagnosis Date    Asthma     COPD (chronic obstructive pulmonary disease) 06/27/2020    Coronary artery disease     Diabetes mellitus     Hyperlipidemia     Hypertension     Myocardial infarction     Pneumonia     Thyroid disease        No Known Allergies    Past Surgical History:   Procedure Laterality Date    APPENDECTOMY      CARDIAC CATHETERIZATION      CHOLECYSTECTOMY      EXPLORATORY LAPAROTOMY      FINGER SURGERY Left     KNEE SURGERY Bilateral     SHOULDER SURGERY Left        Family History   Problem Relation Age of Onset    Heart disease Mother     Heart attack Mother     Hypertension Mother     Heart disease Father     Heart attack Father     Hypertension Father     Heart disease Brother        Social History     Socioeconomic History    Marital status:    Tobacco Use    Smoking status: Every Day     Packs/day: 1.00     Years: 50.00     Pack years: 50.00     Types: Cigarettes    Smokeless tobacco: Never   Vaping Use    Vaping Use: Never used   Substance and Sexual Activity    Alcohol use: No    Drug use: No    Sexual activity: Defer           Objective   Physical Exam  Vitals and nursing note reviewed.   Constitutional:       General: She is not in acute distress.     Appearance: Normal appearance.  She is obese. She is not ill-appearing, toxic-appearing or diaphoretic.      Comments: Patient resting comfortably.  No acute sign of pain or distress.  Nontoxic.   HENT:      Head: Normocephalic and atraumatic. No abrasion or contusion.      Comments: No sign of scalp injury or tenderness.  No hematoma.     Nose: Nose normal.      Mouth/Throat:      Mouth: Mucous membranes are moist.      Pharynx: Oropharynx is clear.   Eyes:      Extraocular Movements: Extraocular movements intact.      Conjunctiva/sclera: Conjunctivae normal.      Pupils: Pupils are equal, round, and reactive to light.   Neck:      Comments: No C-spine tenderness.  Full range of motion.  Cardiovascular:      Rate and Rhythm: Normal rate and regular rhythm. No extrasystoles are present.     Pulses: Normal pulses.           Dorsalis pedis pulses are 2+ on the right side and 2+ on the left side.        Posterior tibial pulses are 2+ on the right side and 2+ on the left side.      Heart sounds: Normal heart sounds.   Pulmonary:      Effort: Pulmonary effort is normal.      Breath sounds: Normal breath sounds.   Abdominal:      General: Bowel sounds are normal.      Palpations: Abdomen is soft.   Musculoskeletal:         General: Normal range of motion.      Cervical back: Normal range of motion and neck supple. No pain with movement, spinous process tenderness or muscular tenderness.      Right knee: Swelling present. No deformity, ecchymosis or lacerations. Normal range of motion. Tenderness (Anterior aspect of right knee) present. No LCL laxity, MCL laxity, ACL laxity or PCL laxity. Normal pulse.      Right lower leg: No edema.      Left lower leg: No edema.      Comments: Mild soft tissue swelling and superficial abrasion to the right knee.  Tenderness to anterior aspect of right knee.  Full range of motion.  No laxity.  No joint effusion.  Tenderness also noted to the dorsal/lateral aspect of the right foot.  No swelling, bruising, and no  deformity.  Full range of motion of all toes on the right foot.  No tenderness to right ankle.  No C, T, or LS spinal tenderness.    Skin:     General: Skin is warm and dry.      Findings: Abrasion present.      Comments: Superficial abrasion to the right knee.   Neurological:      General: No focal deficit present.      Mental Status: She is alert and oriented to person, place, and time.      Cranial Nerves: Cranial nerves 2-12 are intact.      Sensory: Sensation is intact.      Motor: Motor function is intact.      Coordination: Coordination is intact.       Procedures           ED Course  ED Course as of 07/21/23 0100   Fri Jul 21, 2023   0056 X-rays of the right knee reveal no acute osseous abnormality.  Mild to moderate joint degenerative changes including a periarticular erosion at the lateral femoral condyle.  X-ray of the right foot reveals degenerative changes without acute osseous abnormality.  Patient requested an Ace wrap to her right foot.  Recommend close PCP follow-up for recheck.  Return to the ER if worsening symptoms. [FC]   0058 Patient stands on her feet for several hours per day.  She works at [employer].  I tried to offer her a work excuse for tomorrow and she said that she wants to go to work. [FC]      ED Course User Index  [FC] Pam Del Castillo, ADITHYA              No results found for this or any previous visit (from the past 24 hour(s)).  Note: In addition to lab results from this visit, the labs listed above may include labs taken at another facility or during a different encounter within the last 24 hours. Please correlate lab times with ED admission and discharge times for further clarification of the services performed during this visit.    XR Foot 3+ View Right   Final Result   Impression:   Degenerative changes, atherosclerosis and pes planus without acute osseous abnormality.         Electronically Signed: Quinn Morris     7/20/2023 11:33 PM EDT     Workstation ID: [id number]      XR  "Knee 1 or 2 View Right   Final Result   Impression:      1. No acute osseous abnormality.   2. Mild to moderate joint degeneration including a periarticular erosion at the lateral femoral condyle.   3. Atherosclerosis         Electronically Signed: Quinn Morris     7/20/2023 11:32 PM EDT     Workstation ID: [id number]        Vitals:    07/20/23 2239   BP: 146/70   BP Location: Right arm   Patient Position: Sitting   Pulse: 74   Resp: 16   Temp: 98 °F (36.7 °C)   TempSrc: Oral   SpO2: 99%   Weight: 93.4 kg (206 lb)   Height: 157.5 cm (62\")     Medications - No data to display  ECG/EMG Results (last 24 hours)       ** No results found for the last 24 hours. **          No orders to display                                    Medical Decision Making  Amount and/or Complexity of Data Reviewed  Radiology: ordered.        Final diagnoses:   Fall, initial encounter   Contusion of right knee, initial encounter   Sprain of right foot, initial encounter   History of hypertension   History of hyperlipidemia   History of coronary artery disease   History of diabetes mellitus       ED Disposition  ED Disposition       ED Disposition   Discharge    Condition   Stable    Comment   --               Routine PCP    Call today  Call today for close follow-up for recheck    [facility] EMERGENCY DEPARTMENT  [address] [address] [phone]    If symptoms worsen         Medication List      No changes were made to your prescriptions during this visit.            Pam Del Castillo PA-C  07/21/23 0100    "

## 2023-07-21 NOTE — DISCHARGE INSTRUCTIONS
X-rays of the right knee and right foot reveal arthritic changes but no acute bony abnormality.  Recommend ice as needed for swelling.  Continue with Norco as routinely prescribed for pain and patient may take short course of ibuprofen every 8 hours for the next 3 days as needed for pain/inflammation.  We applied an Ace wrap to the right foot to help with stabilization and comfort.  Recommend patient to be off work the next couple of days and recommend close PCP follow-up for recheck.  Continue with all other current medical management.  Return to the ER if worsening symptoms.

## 2023-10-31 RX ORDER — LISINOPRIL 10 MG/1
10 TABLET ORAL DAILY
Qty: 30 TABLET | Refills: 11 | Status: SHIPPED | OUTPATIENT
Start: 2023-10-31

## 2023-11-14 DIAGNOSIS — R00.2 PALPITATIONS: ICD-10-CM

## 2023-11-14 RX ORDER — METOPROLOL SUCCINATE 25 MG/1
25 TABLET, EXTENDED RELEASE ORAL DAILY
Qty: 90 TABLET | Refills: 0 | Status: SHIPPED | OUTPATIENT
Start: 2023-11-14

## 2024-03-15 ENCOUNTER — TELEPHONE (OUTPATIENT)
Dept: CARDIOLOGY | Facility: CLINIC | Age: 69
End: 2024-03-15
Payer: MEDICARE

## 2024-03-15 NOTE — TELEPHONE ENCOUNTER
Pt scheduled an appt with Dr. Pena, EPIC chat message sent to Louann BRITTON to print the pt's records, and EPIC chat message to [facility] to notify pt of appt date and time.

## 2024-03-15 NOTE — TELEPHONE ENCOUNTER
Caller: Kathrine Bella    Relationship to patient: Self    Best call back number: [phone]    Chief complaint: PT HAS BEEN IN AND OUT OF THR ED FOR HER BP HAS BEEN BOTTOMING OUT ON HER WITH READINGS OF 62/51, LOWEST WAS 58/43. PT HAS BEEN AT [facility], SHE LAST WENT TO ED YESTERDAY.     Type of visit: FOLLOW UP FROM [facility]    Requested date: ASAP     Additional notes:12.01.22 WAS LAST VISIT.

## 2024-03-18 ENCOUNTER — OFFICE VISIT (OUTPATIENT)
Dept: CARDIOLOGY | Facility: CLINIC | Age: 69
End: 2024-03-18
Payer: MEDICARE

## 2024-03-18 VITALS
HEART RATE: 72 BPM | WEIGHT: 210 LBS | BODY MASS INDEX: 38.64 KG/M2 | OXYGEN SATURATION: 93 % | DIASTOLIC BLOOD PRESSURE: 49 MMHG | HEIGHT: 62 IN | SYSTOLIC BLOOD PRESSURE: 92 MMHG

## 2024-03-18 DIAGNOSIS — E78.5 DYSLIPIDEMIA: ICD-10-CM

## 2024-03-18 DIAGNOSIS — R06.02 SHORTNESS OF BREATH: ICD-10-CM

## 2024-03-18 DIAGNOSIS — G47.33 OSA (OBSTRUCTIVE SLEEP APNEA): ICD-10-CM

## 2024-03-18 DIAGNOSIS — E07.9 THYROID DISEASE: ICD-10-CM

## 2024-03-18 DIAGNOSIS — F17.210 CIGARETTE SMOKER: ICD-10-CM

## 2024-03-18 DIAGNOSIS — I10 ESSENTIAL HYPERTENSION: ICD-10-CM

## 2024-03-18 DIAGNOSIS — E78.2 MIXED HYPERLIPIDEMIA: ICD-10-CM

## 2024-03-18 DIAGNOSIS — I25.10 CORONARY ARTERY DISEASE INVOLVING NATIVE CORONARY ARTERY OF NATIVE HEART WITHOUT ANGINA PECTORIS: ICD-10-CM

## 2024-03-18 DIAGNOSIS — R07.2 PRECORDIAL PAIN: Primary | ICD-10-CM

## 2024-03-18 DIAGNOSIS — E11.9 TYPE 2 DIABETES MELLITUS WITHOUT COMPLICATION, WITHOUT LONG-TERM CURRENT USE OF INSULIN: ICD-10-CM

## 2024-03-18 PROCEDURE — 99214 OFFICE O/P EST MOD 30 MIN: CPT | Performed by: SPECIALIST

## 2024-03-18 PROCEDURE — 3074F SYST BP LT 130 MM HG: CPT | Performed by: SPECIALIST

## 2024-03-18 PROCEDURE — 3078F DIAST BP <80 MM HG: CPT | Performed by: SPECIALIST

## 2024-03-18 RX ORDER — ALBUTEROL SULFATE 2.5 MG/3ML
2.5 SOLUTION RESPIRATORY (INHALATION) EVERY 4 HOURS PRN
COMMUNITY

## 2024-03-18 RX ORDER — NICOTINE POLACRILEX 4 MG/1
1 GUM, CHEWING ORAL 2 TIMES DAILY
COMMUNITY

## 2024-03-18 RX ORDER — OXYBUTYNIN CHLORIDE 5 MG/1
1 TABLET, EXTENDED RELEASE ORAL DAILY
COMMUNITY

## 2024-03-18 RX ORDER — HYDROCODONE BITARTRATE AND ACETAMINOPHEN 7.5; 325 MG/1; MG/1
1 TABLET ORAL EVERY 6 HOURS PRN
COMMUNITY

## 2024-03-18 NOTE — PROGRESS NOTES
Subjective   Follow up, labile hypertension  Kathrine Bella is a 68 y.o. female who presents to day for Chest Pain (Here for ER f/u) and Shortness of Breath.    CHIEF COMPLIANT  Chief Complaint   Patient presents with    Chest Pain     Here for ER f/u    Shortness of Breath     Problem list  1.  Nonobstructive coronary artery disease  1.1 cardiac catheterization in the distant past [facility] which was nonobstructive per patient report, inadequate data  1.2 cardiac catheterization in [place] approximately 5 years ago and 2014 that was nonobstructive with moderate disease noted in 2 vessels, inadequate data  1.3 repeat cath June 2022 with no significant coronary disease noted and preserved LV function  2.  Preserved systolic function  3.  Hypertension  4.  Dyslipidemia  5.  Chronic tobacco use  6.  Diabetes mellitus  Problem List Items Addressed This Visit          Cardiac and Vasculature    Mixed hyperlipidemia    Relevant Orders    Lipid Panel    Comprehensive Metabolic Panel    Essential hypertension    Relevant Orders    US Renal Artery Complete    Chest pain - Primary    Coronary artery disease involving native coronary artery of native heart without angina pectoris    RESOLVED: Dyslipidemia       Endocrine and Metabolic    Type 2 diabetes mellitus without complication, without long-term current use of insulin    Thyroid disease    Relevant Orders    TSH       Pulmonary and Pneumonias    Shortness of breath       Sleep    LARA (obstructive sleep apnea)    Relevant Orders    Home Sleep Study       Tobacco    Cigarette smoker       HPI  Patient for the last few weeks has been noticing her blood pressure is dropping down sometimes up to 77 systolic but at the same time we will go up to 160 or more systolic and when it goes down she feels bad and fatigued she also feels fatigued tired snores at night she does not have any recent chest pain but she does have shortness of breath NYHA class II with no edema or  palpitations she was advised to check to be checked for sleep apnea years ago but this was not done she said his sugar now is getting under control and she still smokes about a pack per day                      PRIOR MEDS  Current Outpatient Medications on File Prior to Visit   Medication Sig Dispense Refill    albuterol (PROVENTIL) (2.5 MG/3ML) 0.083% nebulizer solution Take 2.5 mg by nebulization Every 4 (Four) Hours As Needed.      atorvastatin (LIPITOR) 80 MG tablet Take 1 tablet by mouth every night at bedtime.  0    budesonide-formoterol (SYMBICORT) 80-4.5 MCG/ACT inhaler 1 puff Daily.      clopidogrel (PLAVIX) 75 MG tablet Take 1 tablet by mouth Daily. 30 tablet 11    escitalopram (LEXAPRO) 20 MG tablet Take 1 tablet by mouth Daily.      gabapentin (NEURONTIN) 600 MG tablet gabapentin 600 mg tablet   TAKE 1 TABLET BY MOUTH THREE TIMES DAILY      HYDROcodone-acetaminophen (NORCO) 7.5-325 MG per tablet Take 1 tablet by mouth Every 6 (Six) Hours As Needed.      hydrOXYzine (ATARAX) 10 MG tablet Take 1 tablet by mouth 3 (Three) Times a Day As Needed.      ipratropium (ATROVENT) 0.02 % nebulizer solution Take 2.5 mL by nebulization 4 (Four) Times a Day As Needed.      levothyroxine (SYNTHROID, LEVOTHROID) 112 MCG tablet Take 1 tablet by mouth Daily.  0    metFORMIN ER (GLUCOPHAGE-XR) 500 MG 24 hr tablet Take 1 tablet by mouth 2 (Two) Times a Day.      metoprolol succinate XL (TOPROL-XL) 25 MG 24 hr tablet Take 1 tablet by mouth Daily. Must be seen for future refills. Please call for appointment. 90 tablet 0    Omeprazole 20 MG tablet delayed-release Take 20 mg by mouth 2 (Two) Times a Day.      oxybutynin XL (DITROPAN-XL) 5 MG 24 hr tablet Take 1 tablet by mouth Daily.      PROAIR  (90 Base) MCG/ACT inhaler As Needed.  0    tiZANidine (ZANAFLEX) 4 MG tablet Take 1 tablet by mouth every night at bedtime.      nitroglycerin (NITROSTAT) 0.4 MG SL tablet TAKE 1 TABLET UNDER THE TONGUE AS NEEDED FOR CHEST PAIN,  MAY REPEAT EVERY 5 MINS UP TO 3 DOSES; IF NO RELIEF CALL 911 (Patient not taking: Reported on 3/18/2024) 25 tablet 2    [DISCONTINUED] amoxicillin-clavulanate (AUGMENTIN) 875-125 MG per tablet Take 1 tablet by mouth 2 (Two) Times a Day.      [DISCONTINUED] aspirin 81 MG EC tablet Take 1 tablet by mouth Daily. 30 tablet 11    [DISCONTINUED] azithromycin (ZITHROMAX) 250 MG tablet Take 250 mg by mouth Daily.      [DISCONTINUED] esomeprazole (nexIUM) 40 MG capsule Daily.      [DISCONTINUED] guaiFENesin 200 MG tablet Take 600 mg by mouth 2 (Two) Times a Day.      [DISCONTINUED] HYDROcodone-Acetaminophen (ZAMCET)  MG/15ML solution Take 1 tablet by mouth Every 8 (Eight) Hours As Needed. for pain  0    [DISCONTINUED] lisinopril (PRINIVIL,ZESTRIL) 10 MG tablet TAKE 1 TABLET BY MOUTH DAILY 30 tablet 11    [DISCONTINUED] meloxicam (MOBIC) 7.5 MG tablet Take 7.5 mg by mouth Daily.      [DISCONTINUED] montelukast (SINGULAIR) 10 MG tablet Take 1 tablet by mouth Daily.      [DISCONTINUED] Nicotine 21-14-7 MG/24HR kit Place 1 container on the skin as directed by provider Daily. 1 each 0    [DISCONTINUED] predniSONE (DELTASONE) 10 MG tablet Take 20 mg by mouth 2 (Two) Times a Day.      [DISCONTINUED] ranolazine (RANEXA) 500 MG 12 hr tablet Take 1 tablet by mouth 2 (Two) Times a Day. 60 tablet 5     No current facility-administered medications on file prior to visit.       ALLERGIES  Patient has no known allergies.    HISTORY  Past Medical History:   Diagnosis Date    Asthma     COPD (chronic obstructive pulmonary disease) 06/27/2020    Coronary artery disease     Diabetes mellitus     Hyperlipidemia     Hypertension     Myocardial infarction     LARA (obstructive sleep apnea) 3/18/2024    Pneumonia     Thyroid disease        Social History     Socioeconomic History    Marital status:    Tobacco Use    Smoking status: Every Day     Current packs/day: 1.00     Average packs/day: 1 pack/day for 50.0 years (50.0 ttl pk-yrs)  "    Types: Cigarettes    Smokeless tobacco: Never   Vaping Use    Vaping status: Never Used   Substance and Sexual Activity    Alcohol use: No    Drug use: No    Sexual activity: Defer       Family History   Problem Relation Age of Onset    Heart disease Mother     Heart attack Mother     Hypertension Mother     Heart disease Father     Heart attack Father     Hypertension Father     Heart disease Brother        Review of Systems   Constitutional:  Positive for fatigue.   HENT: Negative.     Eyes:  Positive for visual disturbance (glasses).   Respiratory:  Positive for shortness of breath.    Cardiovascular:  Positive for chest pain, palpitations and leg swelling (mildly).   Gastrointestinal: Negative.    Endocrine: Negative.    Genitourinary: Negative.    Musculoskeletal:  Positive for arthralgias and myalgias.   Skin: Negative.    Allergic/Immunologic: Positive for environmental allergies.   Neurological:  Positive for dizziness and light-headedness. Negative for syncope.   Hematological:  Bruises/bleeds easily.   Psychiatric/Behavioral: Negative.         Objective     VITALS: BP 92/49 (BP Location: Left arm, Patient Position: Sitting)   Pulse 72   Ht 157.5 cm (62.01\")   Wt 95.3 kg (210 lb)   SpO2 93%   BMI 38.40 kg/m²     LABS:   Lab Results (most recent)       None            IMAGING:   No Images in the past 120 days found..    EXAM:  Physical Exam  Vitals reviewed.   Constitutional:       Appearance: She is well-developed.      Comments: Hypothyroid facies   HENT:      Head: Normocephalic and atraumatic.   Eyes:      Pupils: Pupils are equal, round, and reactive to light.   Neck:      Thyroid: No thyromegaly.      Vascular: No JVD.   Cardiovascular:      Rate and Rhythm: Normal rate and regular rhythm.      Heart sounds: Normal heart sounds. No murmur heard.     No friction rub. No gallop.   Pulmonary:      Effort: Pulmonary effort is normal. No respiratory distress.      Breath sounds: No stridor. " Wheezing present. No rales.   Chest:      Chest wall: No tenderness.   Musculoskeletal:         General: No tenderness or deformity.      Cervical back: Neck supple.   Skin:     General: Skin is warm and dry.   Neurological:      Mental Status: She is alert and oriented to person, place, and time.      Cranial Nerves: No cranial nerve deficit.      Coordination: Coordination normal.         Procedure   Procedures       Assessment & Plan     Diagnoses and all orders for this visit:    1. Precordial pain (Primary)    2. Coronary artery disease involving native coronary artery of native heart without angina pectoris    3. Dyslipidemia    4. Essential hypertension  -     US Renal Artery Complete; Future    5. Mixed hyperlipidemia  -     Lipid Panel; Future  -     Comprehensive Metabolic Panel; Future    6. Shortness of breath    7. Thyroid disease  -     TSH; Future    8. Type 2 diabetes mellitus without complication, without long-term current use of insulin    9. LARA (obstructive sleep apnea)  -     Home Sleep Study; Future    10. Cigarette smoker    1.  Labile blood pressure will get renal arterial Doppler for assessment of renal artery stenosis May consider cutting down dose of gabapentin and Norco  2.  Her symptoms are consistent with sleep apnea and going for her for home sleep study  3.  She has thyroid faces concerned about hypothyroidism I am going to check her TSH  4.  Do not have any recent lipid profile she does not have history of coronary artery disease with cardiac catheterization nonobstructive disease and going to check her lipids and advised her to continue daily aspirin and Plavix  5.  Advised her to quit smoking    Return in about 4 weeks (around 4/15/2024).      Advance Care Planning   ACP discussion was held with the patient during this visit. Patient does not have an advance directive, declines further assistance.             MEDS ORDERED DURING VISIT:  No orders of the defined types were placed in  this encounter.      As always, Ailyn De La Torre APRN  I appreciate very much the opportunity to participate in the cardiovascular care of your patients. Please do not hesitate to call me with any questions with regards to Neoma Bella evaluation and management.         This document has been electronically signed by Vida Pena MD  March 18, 2024 15:35 EDT    This note is dictated utilizing voice recognition software.

## 2024-03-25 ENCOUNTER — TELEPHONE (OUTPATIENT)
Dept: CARDIOLOGY | Facility: CLINIC | Age: 69
End: 2024-03-25

## 2024-03-25 NOTE — TELEPHONE ENCOUNTER
Caller: [facility]    Relationship:     Best call back number: [phone]    What form or medical record are you requesting: OFFICE VISIT NOTE FROM MAY OF LAST YEAR WITH RANEXA PRESCRIPTION.     Who is requesting this form or medical record from you: MARLI MEYER    How would you like to receive the form or medical records (pick-up, mail, fax): FAX  If fax, what is the fax number: [phone]  If mail, what is the address:   If pick-up, provide patient with address and location details    Timeframe paperwork needed:     Additional notes: REQUESTING THE RANEXA PRESCRIPTION OR MEDICATION USED FOR TREATING ANGINA

## 2024-04-03 ENCOUNTER — HOSPITAL ENCOUNTER (OUTPATIENT)
Dept: CARDIOLOGY | Facility: HOSPITAL | Age: 69
Discharge: HOME | End: 2024-04-03
Payer: MEDICARE

## 2024-04-03 ENCOUNTER — LAB (OUTPATIENT)
Dept: LAB | Facility: HOSPITAL | Age: 69
End: 2024-04-03
Payer: MEDICARE

## 2024-04-03 DIAGNOSIS — E07.9 THYROID DISEASE: ICD-10-CM

## 2024-04-03 DIAGNOSIS — I10 ESSENTIAL HYPERTENSION: ICD-10-CM

## 2024-04-03 DIAGNOSIS — E78.2 MIXED HYPERLIPIDEMIA: ICD-10-CM

## 2024-04-03 LAB
ALBUMIN SERPL-MCNC: 3.8 G/DL (ref 3.5–5.2)
ALBUMIN/GLOB SERPL: 1.5 G/DL
ALP SERPL-CCNC: 109 U/L (ref 39–117)
ALT SERPL W P-5'-P-CCNC: 9 U/L (ref 1–33)
ANION GAP SERPL CALCULATED.3IONS-SCNC: 10.5 MMOL/L (ref 5–15)
AST SERPL-CCNC: 15 U/L (ref 1–32)
BILIRUB SERPL-MCNC: 0.3 MG/DL (ref 0–1.2)
BUN SERPL-MCNC: 11 MG/DL (ref 8–23)
BUN/CREAT SERPL: 10.3 (ref 7–25)
CALCIUM SPEC-SCNC: 8.9 MG/DL (ref 8.6–10.5)
CHLORIDE SERPL-SCNC: 105 MMOL/L (ref 98–107)
CHOLEST SERPL-MCNC: 131 MG/DL (ref 0–200)
CO2 SERPL-SCNC: 28.5 MMOL/L (ref 22–29)
CREAT SERPL-MCNC: 1.07 MG/DL (ref 0.57–1)
EGFRCR SERPLBLD CKD-EPI 2021: 57 ML/MIN/1.73
GLOBULIN UR ELPH-MCNC: 2.5 GM/DL
GLUCOSE SERPL-MCNC: 140 MG/DL (ref 65–99)
HDLC SERPL-MCNC: 45 MG/DL (ref 40–60)
LDLC SERPL CALC-MCNC: 50 MG/DL (ref 0–100)
LDLC/HDLC SERPL: 0.92 {RATIO}
POTASSIUM SERPL-SCNC: 4.2 MMOL/L (ref 3.5–5.2)
PROT SERPL-MCNC: 6.3 G/DL (ref 6–8.5)
SODIUM SERPL-SCNC: 144 MMOL/L (ref 136–145)
TRIGL SERPL-MCNC: 223 MG/DL (ref 0–150)
TSH SERPL DL<=0.05 MIU/L-ACNC: 13.25 UIU/ML (ref 0.27–4.2)
VLDLC SERPL-MCNC: 36 MG/DL (ref 5–40)

## 2024-04-03 PROCEDURE — 80053 COMPREHEN METABOLIC PANEL: CPT

## 2024-04-03 PROCEDURE — 84443 ASSAY THYROID STIM HORMONE: CPT

## 2024-04-03 PROCEDURE — 36415 COLL VENOUS BLD VENIPUNCTURE: CPT

## 2024-04-03 PROCEDURE — 80061 LIPID PANEL: CPT

## 2024-04-03 PROCEDURE — 93975 VASCULAR STUDY: CPT

## 2024-04-04 ENCOUNTER — TELEPHONE (OUTPATIENT)
Dept: CARDIOLOGY | Facility: CLINIC | Age: 69
End: 2024-04-04
Payer: MEDICARE

## 2024-04-04 NOTE — TELEPHONE ENCOUNTER
First attempt to reach pt.No answer/no option to LVM.      RELAY      Thyroid, kidney, and sugar levels were elevated. Routed results to PCP.   We also need to order an abdominal CTA to assess for renal artery stenosis that was found on recent imaging.

## 2024-04-04 NOTE — TELEPHONE ENCOUNTER
----- Message from Suzi Jacobsen LPN sent at 4/4/2024 11:12 AM EDT -----  Would you please call her? Thanks, LIZANDRO,JEFF  ----- Message -----  From: Vida Pena MD  Sent: 4/4/2024  10:11 AM EDT  To: Suzi Jacobsen LPN    Please call the patient regarding her abnormal result.  M please send a copy of this to shoulder portal because she has hypothyroidism, thank you

## 2024-04-04 NOTE — TELEPHONE ENCOUNTER
TSH  0.270 - 4.200 uIU/mL 13.250 High      Glucose  65 - 99 mg/dL 140 High      Creatinine  0.57 - 1.00 mg/dL 1.07 High      Forwarded to PCP      US Renal Artery:  IMPRESSION:  Elevated peak systolic velocities in the mid right renal  artery suggestive of a significant proximal stenosis. CTA of the abdomen  is recommended for further characterization.

## 2024-04-05 NOTE — TELEPHONE ENCOUNTER
Patient called and said that she missed a call. Cecelia had called to go over her lab results as well as CTA of Abdomin. I went over these results as follows:    TSH 13.250 range 0.270-4.200    Glucose 140 range 65-99    Creatinine 1.07 range 0.57-1.00    US Renal Artery:  IMPRESSION:  Elevated peak systolic velocities in the mid right renal  artery suggestive of a significant proximal stenosis. CTA of the abdomen  is recommended for further characterization.    Results sent to PCP as well

## 2024-04-09 ENCOUNTER — TELEPHONE (OUTPATIENT)
Dept: CARDIOLOGY | Facility: CLINIC | Age: 69
End: 2024-04-09
Payer: MEDICARE

## 2024-04-09 DIAGNOSIS — I70.1 RENAL ARTERY STENOSIS: Primary | ICD-10-CM

## 2024-04-09 DIAGNOSIS — R93.429 ABNORMAL RENAL ULTRASOUND: ICD-10-CM

## 2024-04-09 DIAGNOSIS — I10 ESSENTIAL HYPERTENSION: ICD-10-CM

## 2024-04-09 NOTE — TELEPHONE ENCOUNTER
PER JOE, PAC :JAKUB LEE CALLED AND SAID THE US RENAL DR FONTENOT ORDERED SAID SHE NEEDED A CT DONE AND WANTS TO MAKE SURE THAT GETS ORDERED. ADDRESSED WITH DR. FONTENOT AND ORDER RECEIVED TO ORDER A CTA ABD. W/WO CONTRAST, BMP PRIOR PER HOSP. PROTOCOL. CALLED AND DISCUSSED ABOVE WITH MRS. HOWARD AND SHE WAS AGREEABLE. DENIES ANY CONTRAST ALLERGY. ORDERS PLACED. PH,LPN

## 2024-04-22 ENCOUNTER — TELEPHONE (OUTPATIENT)
Dept: CARDIOLOGY | Facility: CLINIC | Age: 69
End: 2024-04-22
Payer: MEDICARE

## 2024-04-22 DIAGNOSIS — G47.33 OSA (OBSTRUCTIVE SLEEP APNEA): Primary | ICD-10-CM

## 2024-04-22 NOTE — TELEPHONE ENCOUNTER
PATIENT SEVERE LARA AND HAS BEEN REFERRED TO PULM. FOR CPAP THERAPY. SHE WAS AGREEABLE AND AWARE WILL BE CALLED WITH AN APPT. DATE AND TIME. JEFF BONE          ----- Message from Vida Pena MD sent at 4/22/2024 11:55 AM EDT -----  Please call the patient regarding her abnormal result.  Will refer patient urgently to pulmonology for CPAP trial as she has severe sleep apnea

## 2024-04-29 ENCOUNTER — TELEPHONE (OUTPATIENT)
Dept: CARDIOLOGY | Facility: CLINIC | Age: 69
End: 2024-04-29
Payer: MEDICARE

## 2024-04-29 NOTE — TELEPHONE ENCOUNTER
Caller: Kathrine Bella    Relationship: Self    Best call back number: [phone]     What orders are you requesting (i.e. lab or imaging): HEARING TEST    In what timeframe would the patient need to come in: ASAP    Additional notes: PATIENT'S PREVIOUS ORDERS WERE SENT TO A FACILITY THAT DOES NOT TAKE HER INSURANCE.

## 2024-05-09 RX ORDER — RANOLAZINE 500 MG/1
500 TABLET, EXTENDED RELEASE ORAL 2 TIMES DAILY
Qty: 60 TABLET | Refills: 2 | Status: SHIPPED | OUTPATIENT
Start: 2024-05-09

## 2024-06-10 DIAGNOSIS — I10 ESSENTIAL HYPERTENSION: Primary | ICD-10-CM

## 2024-07-22 ENCOUNTER — DOCUMENTATION (OUTPATIENT)
Dept: CARDIOLOGY | Facility: CLINIC | Age: 69
End: 2024-07-22
Payer: MEDICARE

## 2024-07-22 NOTE — PROGRESS NOTES
CT TECH FROM [facility] CALLED AND WANTED CONFIRMATION ON CT ABD. WHAT EXACTLY SCAN WAS BEING DONE FOR EITHER HTN OR RENAL ARTERY STENOSIS. DR. FONTENOT REVIEWED THE CHART AND TOLD HER SCAN IS FOR RENAL ARTERY STENOSIS. PH,LPN

## 2024-07-29 ENCOUNTER — TELEPHONE (OUTPATIENT)
Dept: CARDIOLOGY | Facility: CLINIC | Age: 69
End: 2024-07-29

## 2024-07-29 ENCOUNTER — TELEPHONE (OUTPATIENT)
Dept: CARDIOLOGY | Facility: CLINIC | Age: 69
End: 2024-07-29
Payer: MEDICARE

## 2024-07-29 DIAGNOSIS — R93.429 ABNORMAL RENAL ULTRASOUND: ICD-10-CM

## 2024-07-29 DIAGNOSIS — I10 ESSENTIAL HYPERTENSION: Primary | ICD-10-CM

## 2024-07-29 DIAGNOSIS — I70.1 RENAL ARTERY STENOSIS: ICD-10-CM

## 2024-07-29 DIAGNOSIS — R93.5 ABNORMAL COMPUTED TOMOGRAPHY ANGIOGRAPHY (CTA) OF ABDOMEN: ICD-10-CM

## 2024-07-29 NOTE — TELEPHONE ENCOUNTER
CTA ABD. RESULTS INDICATING BILAT. RENAL ARTERY STENOSIS DISCUSSED WITH MRS. HOWARD. AWARE WILL BE REFERRED TO DR. ROCHA IF HE DOES ANGIOGRAMS BUT IF NOT THEN WILL REFER ELSEWHERE. VERBALIZED OK. REFERRAL ORDER PLACED. PH,LPN          ----- Message from Vida Pena sent at 7/29/2024 12:33 PM EDT -----  Please call the patient regarding her abnormal result.  Consult Dr Rocha for angiogram

## 2024-07-31 ENCOUNTER — TELEPHONE (OUTPATIENT)
Dept: CARDIOLOGY | Facility: CLINIC | Age: 69
End: 2024-07-31

## 2024-07-31 NOTE — TELEPHONE ENCOUNTER
Caller: DR. LOPEZ OFFICE, [facility]    Relationship: Provider    Best call back number: [phone]    What form or medical record are you requesting: RECENT CT OF ABDOMEN DONE AT [facility]     Who is requesting this form or medical record from you: DR. LOPEZ OFFICE WITH [facility]     How would you like to receive the form or medical records (pick-up, mail, fax): FAX  If fax, what is the fax number: [phone]    Timeframe paperwork needed: ASAP

## 2024-09-16 RX ORDER — RANOLAZINE 500 MG/1
500 TABLET, EXTENDED RELEASE ORAL 2 TIMES DAILY
Qty: 60 TABLET | Refills: 2 | Status: SHIPPED | OUTPATIENT
Start: 2024-09-16

## 2025-01-02 RX ORDER — RANOLAZINE 500 MG/1
500 TABLET, EXTENDED RELEASE ORAL 2 TIMES DAILY
Qty: 180 TABLET | Refills: 3 | Status: SHIPPED | OUTPATIENT
Start: 2025-01-02